# Patient Record
Sex: MALE | Race: BLACK OR AFRICAN AMERICAN | Employment: FULL TIME | URBAN - METROPOLITAN AREA
[De-identification: names, ages, dates, MRNs, and addresses within clinical notes are randomized per-mention and may not be internally consistent; named-entity substitution may affect disease eponyms.]

---

## 2017-05-27 ENCOUNTER — HOSPITAL ENCOUNTER (OUTPATIENT)
Facility: HOSPITAL | Age: 42
Setting detail: OBSERVATION
Discharge: HOME | End: 2017-05-29
Attending: EMERGENCY MEDICINE | Admitting: HOSPITALIST
Payer: COMMERCIAL

## 2017-05-27 ENCOUNTER — APPOINTMENT (OUTPATIENT)
Dept: GENERAL RADIOLOGY | Facility: HOSPITAL | Age: 42
End: 2017-05-27
Attending: EMERGENCY MEDICINE
Payer: COMMERCIAL

## 2017-05-27 DIAGNOSIS — R07.9 CHEST PAIN, UNSPECIFIED TYPE: Primary | ICD-10-CM

## 2017-05-27 PROCEDURE — 71010 XR CHEST AP PORTABLE  (CPT=71010): CPT | Performed by: EMERGENCY MEDICINE

## 2017-05-27 PROCEDURE — 99220 INITIAL OBSERVATION CARE,LEVL III: CPT | Performed by: HOSPITALIST

## 2017-05-27 RX ORDER — HEPARIN SODIUM 5000 [USP'U]/ML
5000 INJECTION, SOLUTION INTRAVENOUS; SUBCUTANEOUS EVERY 12 HOURS SCHEDULED
Status: DISCONTINUED | OUTPATIENT
Start: 2017-05-27 | End: 2017-05-29

## 2017-05-27 RX ORDER — POTASSIUM CHLORIDE 20 MEQ/1
40 TABLET, EXTENDED RELEASE ORAL EVERY 4 HOURS
Status: COMPLETED | OUTPATIENT
Start: 2017-05-27 | End: 2017-05-28

## 2017-05-27 RX ORDER — ASPIRIN 325 MG
325 TABLET ORAL DAILY
Status: DISCONTINUED | OUTPATIENT
Start: 2017-05-28 | End: 2017-05-29

## 2017-05-27 RX ORDER — NITROGLYCERIN 0.4 MG/1
0.4 TABLET SUBLINGUAL EVERY 5 MIN PRN
Status: DISCONTINUED | OUTPATIENT
Start: 2017-05-27 | End: 2017-05-29

## 2017-05-27 RX ORDER — ASPIRIN 81 MG/1
243 TABLET, CHEWABLE ORAL ONCE
Status: COMPLETED | OUTPATIENT
Start: 2017-05-27 | End: 2017-05-27

## 2017-05-27 RX ORDER — NITROGLYCERIN 0.4 MG/1
0.4 TABLET SUBLINGUAL ONCE
Status: COMPLETED | OUTPATIENT
Start: 2017-05-27 | End: 2017-05-27

## 2017-05-28 ENCOUNTER — APPOINTMENT (OUTPATIENT)
Dept: CV DIAGNOSTICS | Facility: HOSPITAL | Age: 42
End: 2017-05-28
Attending: HOSPITALIST
Payer: COMMERCIAL

## 2017-05-28 ENCOUNTER — APPOINTMENT (OUTPATIENT)
Dept: NUCLEAR MEDICINE | Facility: HOSPITAL | Age: 42
End: 2017-05-28
Attending: HOSPITALIST
Payer: COMMERCIAL

## 2017-05-28 PROCEDURE — 78452 HT MUSCLE IMAGE SPECT MULT: CPT | Performed by: HOSPITALIST

## 2017-05-28 PROCEDURE — 93017 CV STRESS TEST TRACING ONLY: CPT | Performed by: HOSPITALIST

## 2017-05-28 PROCEDURE — 99226 SUBSEQUENT OBSERVATION CARE: CPT | Performed by: HOSPITALIST

## 2017-05-28 RX ORDER — ENALAPRILAT 2.5 MG/2ML
1.25 INJECTION INTRAVENOUS EVERY 6 HOURS PRN
Status: DISCONTINUED | OUTPATIENT
Start: 2017-05-28 | End: 2017-05-29

## 2017-05-28 RX ORDER — METOPROLOL SUCCINATE 25 MG/1
25 TABLET, EXTENDED RELEASE ORAL
Qty: 30 TABLET | Refills: 0 | Status: SHIPPED | OUTPATIENT
Start: 2017-05-28 | End: 2017-05-29

## 2017-05-28 RX ORDER — METOPROLOL SUCCINATE 25 MG/1
25 TABLET, EXTENDED RELEASE ORAL
Status: DISCONTINUED | OUTPATIENT
Start: 2017-05-28 | End: 2017-05-29

## 2017-05-28 RX ORDER — 0.9 % SODIUM CHLORIDE 0.9 %
VIAL (ML) INJECTION
Status: COMPLETED
Start: 2017-05-28 | End: 2017-05-28

## 2017-05-28 RX ORDER — SODIUM CHLORIDE 9 MG/ML
INJECTION, SOLUTION INTRAVENOUS
Status: COMPLETED
Start: 2017-05-28 | End: 2017-05-28

## 2017-05-28 NOTE — PLAN OF CARE
CARDIOVASCULAR - ADULT    • Absence of cardiac arrhythmias or at baseline Progressing        Patient/Family Goals    • Patient/Family Short Term Goal Progressing        Stress test pending; monitor blood pressure; if stable plan for discharge home

## 2017-05-28 NOTE — CONSULTS
[facility] AND [facility]  Cardiology Consultation    Venuselysupa Tonja Patient Status:  Observation    1975 MRN [id number]   Location [facility]5W Attending Joe Hernández, [address] [facility] Day # 1 PCP No primary care provider on file.      Reason for Consu MG/ML injection 0.5 mg, 0.5 mg, Intravenous, PRN  •  nitroGLYCERIN (NITROSTAT) SL tab 0.4 mg, 0.4 mg, Sublingual, Q5 Min PRN  •  Heparin Sodium (Porcine) 5000 UNIT/ML injection 5,000 Units, 5,000 Units, Subcutaneous, 2 times per day    Review of Systems: you for allowing me to participate in the care of your patient.     Kimberli Garza MD  5/28/2017  6:45 PM

## 2017-05-28 NOTE — H&P
[facility]    PATIENT'S NAME: Recardo Bone   ATTENDING PHYSICIAN: Nellie Goodwin MD   PATIENT ACCOUNT#:   [id number]    LOCATION:  [address] [place] [address] RECORD #:   [id number]       YOB: 1975  ADMISSION DATE:       05/27 any swelling in his lower extremities or calf tenderness. He has had no wheezing or hemoptysis, no shortness of breath, no nausea or diaphoresis. He was admitted to the telemetry unit for continued chest pain protocol and ultimate stress testing.   When I 163/123. Temperature was 98.3, pulse 73, respiratory rate 18, O2 saturation was 98%. HEENT:  Normocephalic, atraumatic. Pupils equal, reactive to light. Sclerae anicteric. There was no sinus tenderness. Extraocular movements were intact.   Mucous memb medication. We will hold a.m. dose pending stress test so as to not interfere with its accuracy. 3.   Sleep apnea.   The patient reports that he has definitely had a diagnosis of sleep apnea, but the test was inconclusive apparently in terms of what the b

## 2017-05-28 NOTE — BH PROGRESS NOTE
ADMISSION NOTE    40year old male with h/o HTN untreated,  CHELA untreated,  ADHD on adderal presents with chest pain normal cardiac enzymes. . Available medical records partially reviewed. Dictation to follow.     Haseeb Membreno M.D.  5/27/2017

## 2017-05-28 NOTE — IMAGING NOTE
Pt here for a GXT thallium stress test, but B/P is elevated. 157/118 left arm and 157/111 after a 5 min recheck to left arm. Dr Maria Teresa Gunn notified and stated to change test to a lexiscan. Pt denies any c/o headache or chest pain at this time.

## 2017-05-28 NOTE — PLAN OF CARE
Maintains optimal cardiac output and hemodynamic stability Progressing      Absence of cardiac arrhythmias or at baseline Progressing      Patient/Family Long Term Goal Progressing      Patient/Family Short Term Goal Progressing    Pt to have stress test a

## 2017-05-29 ENCOUNTER — APPOINTMENT (OUTPATIENT)
Dept: CV DIAGNOSTICS | Facility: HOSPITAL | Age: 42
End: 2017-05-29
Attending: INTERNAL MEDICINE
Payer: COMMERCIAL

## 2017-05-29 VITALS
HEART RATE: 73 BPM | OXYGEN SATURATION: 98 % | DIASTOLIC BLOOD PRESSURE: 103 MMHG | BODY MASS INDEX: 30.43 KG/M2 | TEMPERATURE: 98 F | RESPIRATION RATE: 14 BRPM | WEIGHT: 247.31 LBS | HEIGHT: 75.6 IN | SYSTOLIC BLOOD PRESSURE: 157 MMHG

## 2017-05-29 PROCEDURE — 93306 TTE W/DOPPLER COMPLETE: CPT | Performed by: INTERNAL MEDICINE

## 2017-05-29 PROCEDURE — 99217 OBSERVATION CARE DISCHARGE: CPT | Performed by: HOSPITALIST

## 2017-05-29 RX ORDER — HYDROCHLOROTHIAZIDE 12.5 MG/1
12.5 CAPSULE, GELATIN COATED ORAL DAILY
Status: DISCONTINUED | OUTPATIENT
Start: 2017-05-29 | End: 2017-05-29

## 2017-05-29 RX ORDER — HYDROCHLOROTHIAZIDE 12.5 MG/1
12.5 CAPSULE, GELATIN COATED ORAL DAILY
Qty: 30 CAPSULE | Refills: 3 | Status: SHIPPED | OUTPATIENT
Start: 2017-05-29 | End: 2017-12-04

## 2017-05-29 RX ORDER — LISINOPRIL 2.5 MG/1
2.5 TABLET ORAL ONCE
Status: COMPLETED | OUTPATIENT
Start: 2017-05-29 | End: 2017-05-29

## 2017-05-29 RX ORDER — LISINOPRIL 2.5 MG/1
2.5 TABLET ORAL ONCE
Status: DISCONTINUED | OUTPATIENT
Start: 2017-05-29 | End: 2017-05-29

## 2017-05-29 RX ORDER — LISINOPRIL 2.5 MG/1
2.5 TABLET ORAL DAILY
Status: DISCONTINUED | OUTPATIENT
Start: 2017-05-29 | End: 2017-05-29

## 2017-05-29 RX ORDER — ACETAMINOPHEN 325 MG/1
650 TABLET ORAL EVERY 6 HOURS PRN
Status: DISCONTINUED | OUTPATIENT
Start: 2017-05-29 | End: 2017-05-29

## 2017-05-29 RX ORDER — LISINOPRIL 5 MG/1
5 TABLET ORAL DAILY
Qty: 30 TABLET | Refills: 3 | Status: SHIPPED | OUTPATIENT
Start: 2017-05-30 | End: 2017-12-04

## 2017-05-29 RX ORDER — LISINOPRIL 5 MG/1
5 TABLET ORAL DAILY
Status: DISCONTINUED | OUTPATIENT
Start: 2017-05-30 | End: 2017-05-29

## 2017-05-29 NOTE — DISCHARGE SUMMARY
[facility] - [facility]    Discharge Summary    Mark Rodríguez Patient Status:  Observation    1975 MRN [id number]   Location [facility]5W Attending Sammy Yoder, [address] [facility] Day # 2 PCP No primary care provider on file.      Date of A chest.  It was worse with deep breaths, but even without deep inspiration, the pain was present and constant.  He went to immediate care about 4 p.m., was evaluated there and sent to the emergency room for evaluation where he received 2 sublingual nitrogly details    hydrochlorothiazide 12.5 MG Caps   Commonly known as:  MICROZIDE        Take 1 capsule (12.5 mg total) by mouth daily.     Quantity:  30 capsule   Refills:  3       lisinopril 5 MG Tabs   Last time this was given:  2.5 mg on 5/29/2017  9:21 AM

## 2017-05-29 NOTE — PROGRESS NOTES
[facility]    Progress Note    Gagan Brown Patient Status:  Observation    1975 MRN [id number]   Location [facility]5W Attending Tena Winter, [address] Day # 2 PCP No primary care provider on file.        Assessment an Recent Labs   Lab  05/27/17   1806  05/28/17   1004   GLU  133*   --    BUN  13   --    CREATSERUM  0.99   --    GFRAA  >60   --    GFRNAA  >60   --    CA  9.1   --    NA  137   --    K  3.4  4.0   CL  104   --    CO2  24   --        Recent Labs   La

## 2017-06-30 ENCOUNTER — BH HISTORICAL (OUTPATIENT)
Dept: OTHER | Age: 42
End: 2017-06-30

## 2017-09-06 ENCOUNTER — BH HISTORICAL (OUTPATIENT)
Dept: OTHER | Age: 42
End: 2017-09-06

## 2017-11-17 ENCOUNTER — BH HISTORICAL (OUTPATIENT)
Dept: OTHER | Age: 42
End: 2017-11-17

## 2017-12-04 ENCOUNTER — OFFICE VISIT (OUTPATIENT)
Dept: INTERNAL MEDICINE CLINIC | Facility: CLINIC | Age: 42
End: 2017-12-04

## 2017-12-04 VITALS
HEIGHT: 75 IN | DIASTOLIC BLOOD PRESSURE: 114 MMHG | TEMPERATURE: 98 F | HEART RATE: 82 BPM | BODY MASS INDEX: 30.3 KG/M2 | WEIGHT: 243.69 LBS | SYSTOLIC BLOOD PRESSURE: 164 MMHG

## 2017-12-04 DIAGNOSIS — I10 ESSENTIAL HYPERTENSION: ICD-10-CM

## 2017-12-04 DIAGNOSIS — G47.33 OSA (OBSTRUCTIVE SLEEP APNEA): ICD-10-CM

## 2017-12-04 DIAGNOSIS — F98.8 ADD (ATTENTION DEFICIT DISORDER) WITHOUT HYPERACTIVITY: ICD-10-CM

## 2017-12-04 DIAGNOSIS — Z00.00 ANNUAL PHYSICAL EXAM: Primary | ICD-10-CM

## 2017-12-04 DIAGNOSIS — I10 UNCONTROLLED HYPERTENSION: ICD-10-CM

## 2017-12-04 DIAGNOSIS — K90.41 NCGS (NON-CELIAC GLUTEN SENSITIVITY): ICD-10-CM

## 2017-12-04 PROCEDURE — 99213 OFFICE O/P EST LOW 20 MIN: CPT | Performed by: INTERNAL MEDICINE

## 2017-12-04 PROCEDURE — 99212 OFFICE O/P EST SF 10 MIN: CPT | Performed by: INTERNAL MEDICINE

## 2017-12-04 PROCEDURE — 99386 PREV VISIT NEW AGE 40-64: CPT | Performed by: INTERNAL MEDICINE

## 2017-12-04 RX ORDER — HYDROCHLOROTHIAZIDE 12.5 MG/1
12.5 CAPSULE, GELATIN COATED ORAL DAILY
Qty: 30 CAPSULE | Refills: 6 | Status: SHIPPED | OUTPATIENT
Start: 2017-12-04 | End: 2018-03-05

## 2017-12-04 RX ORDER — DEXTROAMPHETAMINE SACCHARATE, AMPHETAMINE ASPARTATE MONOHYDRATE, DEXTROAMPHETAMINE SULFATE AND AMPHETAMINE SULFATE 6.25; 6.25; 6.25; 6.25 MG/1; MG/1; MG/1; MG/1
CAPSULE, EXTENDED RELEASE ORAL
COMMUNITY
Start: 2017-11-17

## 2017-12-04 RX ORDER — LISINOPRIL 5 MG/1
5 TABLET ORAL DAILY
Qty: 30 TABLET | Refills: 6 | Status: CANCELLED | OUTPATIENT
Start: 2017-12-04

## 2017-12-04 RX ORDER — LISINOPRIL 20 MG/1
20 TABLET ORAL DAILY
Qty: 30 TABLET | Refills: 3 | Status: SHIPPED | OUTPATIENT
Start: 2017-12-04 | End: 2018-03-04

## 2017-12-04 NOTE — PROGRESS NOTES
Catrina Landis is a 40year old male. Patient presents with:  Physical      HPI:   Here for annual physical    He reports worsening of abdominal bloating, worse with diary products. He reports lot of flatulence.  He also reports increased  bowel movements, Never Used                      Alcohol use: Yes              Comment: 1 per mth       REVIEW OF SYSTEMS:   GENERAL HEALTH: No fevers, chills, sweats, fatigue. VISION: No recent vision problems, blurry vision or double vision.   HEENT: No decreased hearing memory normal.         ASSESSMENT AND PLAN:   Diagnoses and all orders for this visit:    Annual physical exam  Labs ordered. Discussed with the patient about age appropriate screening and immunization.   Advised healthy lifestyle with regular exercise and avoid frozen, canned and processed food. Maintain healthy weight and exercise regularly at least 30-40 minutes 4-5 times a week. Advised to lose weight and make dietary modification. Other orders  -     hydrochlorothiazide 12.5 MG Oral Cap;  Take 1 cap

## 2018-01-02 ENCOUNTER — OFFICE VISIT (OUTPATIENT)
Dept: INTERNAL MEDICINE CLINIC | Facility: CLINIC | Age: 43
End: 2018-01-02

## 2018-01-02 VITALS
HEART RATE: 80 BPM | TEMPERATURE: 99 F | WEIGHT: 245.69 LBS | SYSTOLIC BLOOD PRESSURE: 150 MMHG | DIASTOLIC BLOOD PRESSURE: 85 MMHG | HEIGHT: 75 IN | BODY MASS INDEX: 30.55 KG/M2

## 2018-01-02 DIAGNOSIS — I10 BENIGN ESSENTIAL HYPERTENSION: Primary | ICD-10-CM

## 2018-01-02 PROCEDURE — 99213 OFFICE O/P EST LOW 20 MIN: CPT | Performed by: INTERNAL MEDICINE

## 2018-01-02 PROCEDURE — 99212 OFFICE O/P EST SF 10 MIN: CPT | Performed by: INTERNAL MEDICINE

## 2018-01-06 ENCOUNTER — CHARTING TRANS (OUTPATIENT)
Dept: OTHER | Age: 43
End: 2018-01-06

## 2018-01-06 ENCOUNTER — LAB ENCOUNTER (OUTPATIENT)
Dept: LAB | Facility: HOSPITAL | Age: 43
End: 2018-01-06
Attending: INTERNAL MEDICINE
Payer: COMMERCIAL

## 2018-01-06 DIAGNOSIS — Z00.00 ANNUAL PHYSICAL EXAM: ICD-10-CM

## 2018-01-06 DIAGNOSIS — K90.41 NCGS (NON-CELIAC GLUTEN SENSITIVITY): ICD-10-CM

## 2018-01-06 LAB
ALBUMIN SERPL BCP-MCNC: 4.2 G/DL (ref 3.5–4.8)
ALBUMIN/GLOB SERPL: 1.4 {RATIO} (ref 1–2)
ALP SERPL-CCNC: 66 U/L (ref 32–100)
ALT SERPL-CCNC: 22 U/L (ref 17–63)
ANION GAP SERPL CALC-SCNC: 8 MMOL/L (ref 0–18)
AST SERPL-CCNC: 21 U/L (ref 15–41)
BASOPHILS # BLD: 0.1 K/UL (ref 0–0.2)
BASOPHILS NFR BLD: 1 %
BILIRUB SERPL-MCNC: 1.1 MG/DL (ref 0.3–1.2)
BUN SERPL-MCNC: 11 MG/DL (ref 8–20)
BUN/CREAT SERPL: 12.4 (ref 10–20)
CALCIUM SERPL-MCNC: 9.3 MG/DL (ref 8.5–10.5)
CHLORIDE SERPL-SCNC: 100 MMOL/L (ref 95–110)
CHOLEST SERPL-MCNC: 190 MG/DL (ref 110–200)
CO2 SERPL-SCNC: 26 MMOL/L (ref 22–32)
CREAT SERPL-MCNC: 0.89 MG/DL (ref 0.5–1.5)
EOSINOPHIL # BLD: 0.2 K/UL (ref 0–0.7)
EOSINOPHIL NFR BLD: 3 %
ERYTHROCYTE [DISTWIDTH] IN BLOOD BY AUTOMATED COUNT: 13 % (ref 11–15)
GLOBULIN PLAS-MCNC: 3 G/DL (ref 2.5–3.7)
GLUCOSE SERPL-MCNC: 171 MG/DL (ref 70–99)
HBA1C MFR BLD: 6.2 % (ref 4–6)
HCT VFR BLD AUTO: 41.8 % (ref 41–52)
HDLC SERPL-MCNC: 36 MG/DL
HGB BLD-MCNC: 14.5 G/DL (ref 13.5–17.5)
LDLC SERPL CALC-MCNC: 96 MG/DL (ref 0–99)
LYMPHOCYTES # BLD: 2.5 K/UL (ref 1–4)
LYMPHOCYTES NFR BLD: 34 %
MCH RBC QN AUTO: 29.1 PG (ref 27–32)
MCHC RBC AUTO-ENTMCNC: 34.6 G/DL (ref 32–37)
MCV RBC AUTO: 84 FL (ref 80–100)
MONOCYTES # BLD: 0.8 K/UL (ref 0–1)
MONOCYTES NFR BLD: 10 %
NEUTROPHILS # BLD AUTO: 3.9 K/UL (ref 1.8–7.7)
NEUTROPHILS NFR BLD: 52 %
NONHDLC SERPL-MCNC: 154 MG/DL
OSMOLALITY UR CALC.SUM OF ELEC: 281 MOSM/KG (ref 275–295)
PLATELET # BLD AUTO: 284 K/UL (ref 140–400)
PMV BLD AUTO: 8.7 FL (ref 7.4–10.3)
POTASSIUM SERPL-SCNC: 3.4 MMOL/L (ref 3.3–5.1)
PROT SERPL-MCNC: 7.2 G/DL (ref 5.9–8.4)
PSA SERPL-MCNC: 1.1 NG/ML (ref 0–4)
RBC # BLD AUTO: 4.97 M/UL (ref 4.5–5.9)
SODIUM SERPL-SCNC: 134 MMOL/L (ref 136–144)
TRIGL SERPL-MCNC: 291 MG/DL (ref 1–149)
TSH SERPL-ACNC: 0.65 UIU/ML (ref 0.45–5.33)
WBC # BLD AUTO: 7.5 K/UL (ref 4–11)

## 2018-01-06 PROCEDURE — 36415 COLL VENOUS BLD VENIPUNCTURE: CPT

## 2018-01-06 PROCEDURE — 82306 VITAMIN D 25 HYDROXY: CPT

## 2018-01-06 PROCEDURE — 80061 LIPID PANEL: CPT

## 2018-01-06 PROCEDURE — 83036 HEMOGLOBIN GLYCOSYLATED A1C: CPT

## 2018-01-06 PROCEDURE — 84443 ASSAY THYROID STIM HORMONE: CPT

## 2018-01-06 PROCEDURE — 83516 IMMUNOASSAY NONANTIBODY: CPT

## 2018-01-06 PROCEDURE — 80053 COMPREHEN METABOLIC PANEL: CPT

## 2018-01-06 PROCEDURE — 85025 COMPLETE CBC W/AUTO DIFF WBC: CPT

## 2018-01-09 ENCOUNTER — TELEPHONE (OUTPATIENT)
Dept: OTHER | Age: 43
End: 2018-01-09

## 2018-01-09 PROBLEM — R73.03 PREDIABETES: Status: ACTIVE | Noted: 2018-01-09

## 2018-01-09 PROBLEM — E78.1 HYPERTRIGLYCERIDEMIA: Status: ACTIVE | Noted: 2018-01-09

## 2018-01-09 LAB — 25(OH)D3 SERPL-MCNC: 13.1 NG/ML

## 2018-01-10 PROBLEM — E55.9 VITAMIN D DEFICIENCY: Status: ACTIVE | Noted: 2018-01-10

## 2018-01-10 LAB — TTG IGG SER-ACNC: <0.6 U/ML (ref ?–7)

## 2018-01-27 NOTE — ED INITIAL ASSESSMENT (HPI)
Patient came from immediate care, c/o chest pain, constant since about 1030 today. Denies SOB. Pain worsens when taking a deep breath and exhaling. No cardiac hx that hes aware of. Alert, oriented and steady gait. Received 81mg aspirin PTA.  Chem 98
mid chest

## 2018-02-14 ENCOUNTER — APPOINTMENT (OUTPATIENT)
Dept: LAB | Facility: HOSPITAL | Age: 43
End: 2018-02-14
Attending: INTERNAL MEDICINE
Payer: COMMERCIAL

## 2018-02-14 ENCOUNTER — TELEPHONE (OUTPATIENT)
Dept: INTERNAL MEDICINE CLINIC | Facility: CLINIC | Age: 43
End: 2018-02-14

## 2018-02-14 DIAGNOSIS — E78.1 HYPERTRIGLYCERIDEMIA: Primary | ICD-10-CM

## 2018-02-14 DIAGNOSIS — E78.1 HYPERTRIGLYCERIDEMIA: ICD-10-CM

## 2018-02-14 LAB
CHOLEST SERPL-MCNC: 206 MG/DL (ref 110–200)
HDLC SERPL-MCNC: 36 MG/DL
LDLC SERPL CALC-MCNC: 137 MG/DL (ref 0–99)
NONHDLC SERPL-MCNC: 170 MG/DL
TRIGL SERPL-MCNC: 165 MG/DL (ref 1–149)

## 2018-02-14 PROCEDURE — 80061 LIPID PANEL: CPT

## 2018-02-14 PROCEDURE — 36415 COLL VENOUS BLD VENIPUNCTURE: CPT

## 2018-02-14 NOTE — TELEPHONE ENCOUNTER
Please inform the patient that I have placed the order for lipid panel, as it is abnormal before with elevated triglycerides and he was not fasting.

## 2018-02-14 NOTE — TELEPHONE ENCOUNTER
Pt states that he is at the [facility] to do a repeat of his blood work. Per pt he was told to have them repeated that the last he received the results. Brennan Alvarado from [facility] out patient registration could be reached at ext [phone].

## 2018-02-14 NOTE — TELEPHONE ENCOUNTER
Patient is at the lab and was told in his last visit that there would be in order in the system to get labs done. Did you want to order any?

## 2018-03-05 ENCOUNTER — OFFICE VISIT (OUTPATIENT)
Dept: INTERNAL MEDICINE CLINIC | Facility: CLINIC | Age: 43
End: 2018-03-05

## 2018-03-05 VITALS
HEART RATE: 76 BPM | BODY MASS INDEX: 30.96 KG/M2 | DIASTOLIC BLOOD PRESSURE: 97 MMHG | TEMPERATURE: 99 F | SYSTOLIC BLOOD PRESSURE: 152 MMHG | WEIGHT: 249 LBS | HEIGHT: 75 IN

## 2018-03-05 DIAGNOSIS — I10 ESSENTIAL HYPERTENSION: Primary | ICD-10-CM

## 2018-03-05 DIAGNOSIS — E78.2 MIXED HYPERLIPIDEMIA: ICD-10-CM

## 2018-03-05 DIAGNOSIS — R73.03 PREDIABETES: ICD-10-CM

## 2018-03-05 PROCEDURE — 99214 OFFICE O/P EST MOD 30 MIN: CPT | Performed by: INTERNAL MEDICINE

## 2018-03-05 PROCEDURE — 99212 OFFICE O/P EST SF 10 MIN: CPT | Performed by: INTERNAL MEDICINE

## 2018-03-05 RX ORDER — ERGOCALCIFEROL 1.25 MG/1
50000 CAPSULE ORAL WEEKLY
Qty: 12 CAPSULE | Refills: 0 | Status: SHIPPED | OUTPATIENT
Start: 2018-03-05 | End: 2018-06-09

## 2018-03-05 RX ORDER — HYDROCHLOROTHIAZIDE 25 MG/1
25 TABLET ORAL DAILY
Qty: 90 TABLET | Refills: 1 | Status: SHIPPED | OUTPATIENT
Start: 2018-03-05 | End: 2018-06-03

## 2018-03-05 RX ORDER — LISINOPRIL 10 MG/1
10 TABLET ORAL DAILY
Qty: 30 TABLET | Refills: 3 | Status: SHIPPED | OUTPATIENT
Start: 2018-03-05 | End: 2019-09-27

## 2018-03-05 NOTE — PROGRESS NOTES
Virgia Closs is a 40year old male. Patient presents with:  Hypertension: f/u  Abnormal Labs      HPI:   Patient is here for follow up of hypertension and abnormal blood work results. He is on hydrochlorothiazide 12.5 mg for hypertension.   He has been swelling in feet. GI: denies abdominal pain and denies heartburn, nausea or vomiting. : Increased urinary frequency, being on water pill. Breanne Spring MUSK: Back pain present.   NEURO: denies tingling numbness or headaches  ENDO: No fatigue, polyuria, polydipsia, lisinopril 10 mg daily. Reassess in 4 weeks. If needed can go up on lisinopril at that time. Side effects of lisinopril especially dry cough, hyperkalemia and angioedema explained to the patient. He verbalizes understanding.     Mixed hyperlipidemia  La

## 2018-03-08 ENCOUNTER — TELEPHONE (OUTPATIENT)
Dept: OTHER | Age: 43
End: 2018-03-08

## 2018-03-08 NOTE — TELEPHONE ENCOUNTER
Pt had question regarding Vit d  .   Pharmacy called rx was picked on 3/2/18  The 3/5/18 authorized Rx will not be dispensed yet as refill request too soon  Pt informed

## 2018-03-08 NOTE — TELEPHONE ENCOUNTER
Patient stated that he did not get the ergocalciferol at the pharmacy. Script sent 3/5/18 with two other medications that the patient did receive.     Pharmacy called, script of 3/5/18 too early, received 4 pills on 3/2/18 for the month of March, he can pic

## 2018-03-18 ENCOUNTER — HOSPITAL ENCOUNTER (OUTPATIENT)
Age: 43
Discharge: HOME | End: 2018-03-18
Attending: EMERGENCY MEDICINE
Payer: COMMERCIAL

## 2018-03-18 VITALS
OXYGEN SATURATION: 98 % | RESPIRATION RATE: 16 BRPM | BODY MASS INDEX: 28.6 KG/M2 | HEART RATE: 72 BPM | SYSTOLIC BLOOD PRESSURE: 151 MMHG | WEIGHT: 230 LBS | HEIGHT: 75 IN | DIASTOLIC BLOOD PRESSURE: 103 MMHG | TEMPERATURE: 97 F

## 2018-03-18 DIAGNOSIS — S61.412A LACERATION OF LEFT HAND WITHOUT FOREIGN BODY, INITIAL ENCOUNTER: Primary | ICD-10-CM

## 2018-03-18 PROCEDURE — 12002 RPR S/N/AX/GEN/TRNK2.6-7.5CM: CPT

## 2018-03-18 PROCEDURE — 99213 OFFICE O/P EST LOW 20 MIN: CPT

## 2018-03-18 PROCEDURE — 90471 IMMUNIZATION ADMIN: CPT
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
## 2018-03-18 NOTE — ED PROVIDER NOTES
[facility] [address] [facility]    History   Patient presents with:  Laceration Abrasion (integumentary)    Stated Complaint: L hand laceration     HPI    The patient is a 52-year-old male with a history of hypertension who p noted    ED Course   Labs Reviewed - No data to display    ED Course as of Mar 18 1825  ------------------------------------------------------------       [facility]   Procedure: Irrigated copiously with saline and prepped with Betadine  Anesthetized with 1% with

## 2018-03-19 ENCOUNTER — HOSPITAL ENCOUNTER (OUTPATIENT)
Age: 43
Discharge: HOME | End: 2018-03-19
Payer: COMMERCIAL

## 2018-03-19 VITALS
SYSTOLIC BLOOD PRESSURE: 150 MMHG | BODY MASS INDEX: 28.6 KG/M2 | TEMPERATURE: 98 F | OXYGEN SATURATION: 99 % | WEIGHT: 230 LBS | RESPIRATION RATE: 18 BRPM | DIASTOLIC BLOOD PRESSURE: 99 MMHG | HEART RATE: 74 BPM | HEIGHT: 75 IN

## 2018-03-19 DIAGNOSIS — Z51.89 VISIT FOR WOUND CHECK: Primary | ICD-10-CM

## 2018-03-19 PROCEDURE — 99211 OFF/OP EST MAY X REQ PHY/QHP: CPT

## 2018-03-19 RX ORDER — GINSENG 100 MG
CAPSULE ORAL ONCE
Status: COMPLETED | OUTPATIENT
Start: 2018-03-19 | End: 2018-03-19

## 2018-03-19 NOTE — ED INITIAL ASSESSMENT (HPI)
Pt with suture to palm of left hand. States he went to work today and felt pulling sensation at suture line with increased pain. Concerned he may have \"popped\" a suture.  Suture line intact

## 2018-03-19 NOTE — ED PROVIDER NOTES
Patient presents with:  Laceration Abrasion (integumentary)      HPI:     Adrienne Sellers is a 40year old male presents for a chief complaint of needing a wound check. The patient had 8 sutures placed in the palm of the left hand yesterday.   The patient s intact. The patient is able to move all digits of the left hand without difficulty. He is able to make a fist without any difficulty. CMS is intact. Anesthetic / Repair:  No further repair is needed.   Wound care and a dressing was applied with bacit week  For suture removal

## 2018-03-26 ENCOUNTER — HOSPITAL ENCOUNTER (OUTPATIENT)
Age: 43
Discharge: HOME | End: 2018-03-26
Payer: COMMERCIAL

## 2018-03-26 VITALS
DIASTOLIC BLOOD PRESSURE: 91 MMHG | RESPIRATION RATE: 18 BRPM | SYSTOLIC BLOOD PRESSURE: 129 MMHG | HEART RATE: 79 BPM | OXYGEN SATURATION: 100 % | TEMPERATURE: 98 F

## 2018-03-26 DIAGNOSIS — Z48.02 ENCOUNTER FOR REMOVAL OF SUTURES: Primary | ICD-10-CM

## 2018-03-26 NOTE — ED PROVIDER NOTES
Patient presents with:  John Morgan (ingtegumentary)      HPI:     Virgia Closs is a 40year old male presents for suture removal removal. Injury occurred 8 days ago. The patient denies wound problems or concerns.      Family History   Problem R removed out complication. The patient tolerated the procedure well. There are no signs of infection and the wound is healing appropriately. Diagnosis:    ICD-10-CM    1.  Encounter for removal of sutures Z48.02

## 2018-04-09 ENCOUNTER — OFFICE VISIT (OUTPATIENT)
Dept: INTERNAL MEDICINE CLINIC | Facility: CLINIC | Age: 43
End: 2018-04-09

## 2018-04-09 VITALS
TEMPERATURE: 99 F | BODY MASS INDEX: 29.12 KG/M2 | HEIGHT: 75 IN | DIASTOLIC BLOOD PRESSURE: 79 MMHG | WEIGHT: 234.19 LBS | HEART RATE: 81 BPM | SYSTOLIC BLOOD PRESSURE: 123 MMHG

## 2018-04-09 DIAGNOSIS — R73.03 PREDIABETES: ICD-10-CM

## 2018-04-09 DIAGNOSIS — I10 ESSENTIAL HYPERTENSION: Primary | ICD-10-CM

## 2018-04-09 PROBLEM — R07.9 CHEST PAIN: Status: RESOLVED | Noted: 2017-05-27 | Resolved: 2018-04-09

## 2018-04-09 PROCEDURE — 99213 OFFICE O/P EST LOW 20 MIN: CPT | Performed by: INTERNAL MEDICINE

## 2018-04-09 PROCEDURE — 99212 OFFICE O/P EST SF 10 MIN: CPT | Performed by: INTERNAL MEDICINE

## 2018-04-09 NOTE — ASSESSMENT & PLAN NOTE
Hemoglobin A1c of 6.2 in February 2018 with hyperglycemia. Advised low-carb ADA diet. Explained the risk of progression to full-blown diabetes if left uncontrolled. Microvascular and macrovascular complications of diabetes explained to the patient.   Alexandra

## 2018-04-09 NOTE — PROGRESS NOTES
Rafia Todd is a 40year old male. Patient presents with:  Hypertension: f/u      HPI:     Hypertension   This is a chronic problem. Episode onset: 1 year. The problem has been gradually improving since onset. The problem is controlled.  Associated symp Negative for shortness of breath. Cardiovascular: Negative for chest pain, palpitations, orthopnea and PND. Gastrointestinal: Negative. Musculoskeletal: Negative for neck pain. Neurological: Negative for headaches.    Psychiatric/Behavioral: 4-5 days/week                           The patient indicates understanding of these issues and agrees to the plan.               Franc Pang MD  4/9/2018  6:11 PM

## 2018-04-09 NOTE — ASSESSMENT & PLAN NOTE
Controlled on dual therapy with hydrochlorothiazide 25 mg and lisinopril 10 mg. Advised low salt diet. Eat less of canned , frozen, dried, packaged and fast food. Limit caffeine, alcohol. No smoking.   Exercise regularly, at least 20 minutes 3 times a we

## 2018-06-10 RX ORDER — ERGOCALCIFEROL 1.25 MG/1
CAPSULE ORAL
Qty: 12 CAPSULE | Refills: 0 | Status: SHIPPED | OUTPATIENT
Start: 2018-06-10

## 2018-06-10 NOTE — TELEPHONE ENCOUNTER
LOV: 4-9-18 Last Rx: 3-5-18     No protocol     Please advise in regards to refill request. Thank You

## 2018-08-21 ENCOUNTER — BH HISTORICAL (OUTPATIENT)
Dept: OTHER | Age: 43
End: 2018-08-21

## 2018-12-27 ENCOUNTER — TELEPHONE (OUTPATIENT)
Dept: BEHAVIORAL HEALTH | Age: 43
End: 2018-12-27

## 2018-12-28 RX ORDER — DEXTROAMPHETAMINE SACCHARATE, AMPHETAMINE ASPARTATE MONOHYDRATE, DEXTROAMPHETAMINE SULFATE AND AMPHETAMINE SULFATE 6.25; 6.25; 6.25; 6.25 MG/1; MG/1; MG/1; MG/1
25 CAPSULE, EXTENDED RELEASE ORAL DAILY
Qty: 30 CAPSULE | Refills: 0 | Status: SHIPPED | OUTPATIENT
Start: 2018-12-28 | End: 2019-02-09 | Stop reason: SDUPTHER

## 2018-12-28 RX ORDER — DEXTROAMPHETAMINE SACCHARATE, AMPHETAMINE ASPARTATE MONOHYDRATE, DEXTROAMPHETAMINE SULFATE AND AMPHETAMINE SULFATE 6.25; 6.25; 6.25; 6.25 MG/1; MG/1; MG/1; MG/1
CAPSULE, EXTENDED RELEASE ORAL DAILY
COMMUNITY
Start: 2018-08-07 | End: 2018-12-28 | Stop reason: SDUPTHER

## 2019-02-06 ENCOUNTER — TELEPHONE (OUTPATIENT)
Dept: BEHAVIORAL HEALTH | Age: 44
End: 2019-02-06

## 2019-02-09 RX ORDER — DEXTROAMPHETAMINE SACCHARATE, AMPHETAMINE ASPARTATE MONOHYDRATE, DEXTROAMPHETAMINE SULFATE AND AMPHETAMINE SULFATE 6.25; 6.25; 6.25; 6.25 MG/1; MG/1; MG/1; MG/1
25 CAPSULE, EXTENDED RELEASE ORAL DAILY
Qty: 30 CAPSULE | Refills: 0 | Status: SHIPPED | OUTPATIENT
Start: 2019-02-09 | End: 2019-02-11 | Stop reason: SDUPTHER

## 2019-02-14 ENCOUNTER — OFFICE VISIT (OUTPATIENT)
Dept: BEHAVIORAL HEALTH | Age: 44
End: 2019-02-14

## 2019-02-14 DIAGNOSIS — F90.0 ATTENTION DEFICIT HYPERACTIVITY DISORDER (ADHD), PREDOMINANTLY INATTENTIVE TYPE: Primary | ICD-10-CM

## 2019-02-14 PROCEDURE — 99213 OFFICE O/P EST LOW 20 MIN: CPT | Performed by: PSYCHIATRY & NEUROLOGY

## 2019-02-14 RX ORDER — DEXTROAMPHETAMINE SACCHARATE, AMPHETAMINE ASPARTATE MONOHYDRATE, DEXTROAMPHETAMINE SULFATE AND AMPHETAMINE SULFATE 6.25; 6.25; 6.25; 6.25 MG/1; MG/1; MG/1; MG/1
25 CAPSULE, EXTENDED RELEASE ORAL DAILY
Qty: 30 CAPSULE | Refills: 0 | Status: SHIPPED | OUTPATIENT
Start: 2019-02-14 | End: 2019-02-14 | Stop reason: SDUPTHER

## 2019-02-14 RX ORDER — DEXTROAMPHETAMINE SACCHARATE, AMPHETAMINE ASPARTATE MONOHYDRATE, DEXTROAMPHETAMINE SULFATE AND AMPHETAMINE SULFATE 6.25; 6.25; 6.25; 6.25 MG/1; MG/1; MG/1; MG/1
25 CAPSULE, EXTENDED RELEASE ORAL DAILY
Qty: 30 CAPSULE | Refills: 0 | Status: SHIPPED | OUTPATIENT
Start: 2019-02-14 | End: 2019-06-06 | Stop reason: SDUPTHER

## 2019-06-04 ENCOUNTER — TELEPHONE (OUTPATIENT)
Dept: BEHAVIORAL HEALTH | Age: 44
End: 2019-06-04

## 2019-06-06 RX ORDER — DEXTROAMPHETAMINE SACCHARATE, AMPHETAMINE ASPARTATE MONOHYDRATE, DEXTROAMPHETAMINE SULFATE AND AMPHETAMINE SULFATE 6.25; 6.25; 6.25; 6.25 MG/1; MG/1; MG/1; MG/1
25 CAPSULE, EXTENDED RELEASE ORAL DAILY
Qty: 30 CAPSULE | Refills: 0 | Status: SHIPPED | OUTPATIENT
Start: 2019-06-06 | End: 2019-07-12 | Stop reason: SDUPTHER

## 2019-07-12 ENCOUNTER — TELEPHONE (OUTPATIENT)
Dept: BEHAVIORAL HEALTH | Age: 44
End: 2019-07-12

## 2019-07-12 RX ORDER — DEXTROAMPHETAMINE SACCHARATE, AMPHETAMINE ASPARTATE MONOHYDRATE, DEXTROAMPHETAMINE SULFATE AND AMPHETAMINE SULFATE 6.25; 6.25; 6.25; 6.25 MG/1; MG/1; MG/1; MG/1
25 CAPSULE, EXTENDED RELEASE ORAL DAILY
Qty: 30 CAPSULE | Refills: 0 | Status: SHIPPED | OUTPATIENT
Start: 2019-07-12 | End: 2019-08-22 | Stop reason: SDUPTHER

## 2019-08-20 ENCOUNTER — TELEPHONE (OUTPATIENT)
Dept: BEHAVIORAL HEALTH | Age: 44
End: 2019-08-20

## 2019-08-22 RX ORDER — DEXTROAMPHETAMINE SACCHARATE, AMPHETAMINE ASPARTATE MONOHYDRATE, DEXTROAMPHETAMINE SULFATE AND AMPHETAMINE SULFATE 6.25; 6.25; 6.25; 6.25 MG/1; MG/1; MG/1; MG/1
25 CAPSULE, EXTENDED RELEASE ORAL DAILY
Qty: 30 CAPSULE | Refills: 0 | Status: SHIPPED | OUTPATIENT
Start: 2019-08-22 | End: 2019-09-20 | Stop reason: SDUPTHER

## 2019-09-20 ENCOUNTER — OFFICE VISIT (OUTPATIENT)
Dept: BEHAVIORAL HEALTH | Age: 44
End: 2019-09-20

## 2019-09-20 DIAGNOSIS — F90.0 ATTENTION DEFICIT HYPERACTIVITY DISORDER (ADHD), PREDOMINANTLY INATTENTIVE TYPE: Primary | ICD-10-CM

## 2019-09-20 PROCEDURE — 99213 OFFICE O/P EST LOW 20 MIN: CPT | Performed by: PSYCHIATRY & NEUROLOGY

## 2019-09-20 RX ORDER — DEXTROAMPHETAMINE SACCHARATE, AMPHETAMINE ASPARTATE MONOHYDRATE, DEXTROAMPHETAMINE SULFATE AND AMPHETAMINE SULFATE 6.25; 6.25; 6.25; 6.25 MG/1; MG/1; MG/1; MG/1
25 CAPSULE, EXTENDED RELEASE ORAL DAILY
Qty: 30 CAPSULE | Refills: 0 | Status: SHIPPED | OUTPATIENT
Start: 2019-09-20 | End: 2019-11-06 | Stop reason: SDUPTHER

## 2019-09-27 ENCOUNTER — HOSPITAL ENCOUNTER (OUTPATIENT)
Age: 44
Discharge: HOME | End: 2019-09-27
Payer: COMMERCIAL

## 2019-09-27 VITALS
SYSTOLIC BLOOD PRESSURE: 152 MMHG | OXYGEN SATURATION: 97 % | TEMPERATURE: 98 F | HEART RATE: 76 BPM | RESPIRATION RATE: 20 BRPM | DIASTOLIC BLOOD PRESSURE: 99 MMHG

## 2019-09-27 DIAGNOSIS — Z76.0 ENCOUNTER FOR MEDICATION REFILL: Primary | ICD-10-CM

## 2019-09-27 PROCEDURE — 99213 OFFICE O/P EST LOW 20 MIN: CPT

## 2019-09-27 PROCEDURE — 99214 OFFICE O/P EST MOD 30 MIN: CPT

## 2019-09-27 RX ORDER — LISINOPRIL 10 MG/1
10 TABLET ORAL DAILY
Qty: 30 TABLET | Refills: 3 | Status: SHIPPED | OUTPATIENT
Start: 2019-09-27

## 2019-09-27 NOTE — ED INITIAL ASSESSMENT (HPI)
PATIENT STATES HE FEELS LIKE HIS \"BLOOD PRESSURE IS UP. \"  STATES HE HAS NOT SEEN HIS PCP AND HAS RUN OUT OF HIS BLOOD PRESSURE MEDICATIONS. PROVIDER AT BEDSIDE.

## 2019-09-27 NOTE — ED PROVIDER NOTES
Patient presents with:  Hypertension (cardiovascular)      HPI:     Brittnee Taylor is a 38year old male with a past history of hypertension who was supposed to be on lisinopril presents with a chief complaint of medication refill.   Patient reports he is i for him. Pt verbalized plan of care and states understanding. Orders Placed This Encounter      lisinopril 10 MG Oral Tab          Sig: Take 1 tablet (10 mg total) by mouth daily.  For high blood pressure          Dispense:  30 tablet          Refill:

## 2019-11-04 ENCOUNTER — TELEPHONE (OUTPATIENT)
Dept: BEHAVIORAL HEALTH | Age: 44
End: 2019-11-04

## 2019-11-06 RX ORDER — DEXTROAMPHETAMINE SACCHARATE, AMPHETAMINE ASPARTATE MONOHYDRATE, DEXTROAMPHETAMINE SULFATE AND AMPHETAMINE SULFATE 6.25; 6.25; 6.25; 6.25 MG/1; MG/1; MG/1; MG/1
25 CAPSULE, EXTENDED RELEASE ORAL DAILY
Qty: 30 CAPSULE | Refills: 0 | Status: SHIPPED | OUTPATIENT
Start: 2019-11-06 | End: 2019-12-18 | Stop reason: SDUPTHER

## 2019-12-16 ENCOUNTER — TELEPHONE (OUTPATIENT)
Dept: BEHAVIORAL HEALTH | Age: 44
End: 2019-12-16

## 2019-12-18 RX ORDER — DEXTROAMPHETAMINE SACCHARATE, AMPHETAMINE ASPARTATE MONOHYDRATE, DEXTROAMPHETAMINE SULFATE AND AMPHETAMINE SULFATE 6.25; 6.25; 6.25; 6.25 MG/1; MG/1; MG/1; MG/1
25 CAPSULE, EXTENDED RELEASE ORAL DAILY
Qty: 30 CAPSULE | Refills: 0 | Status: SHIPPED | OUTPATIENT
Start: 2019-12-18 | End: 2020-02-05 | Stop reason: SDUPTHER

## 2020-02-03 ENCOUNTER — TELEPHONE (OUTPATIENT)
Dept: BEHAVIORAL HEALTH | Age: 45
End: 2020-02-03

## 2020-02-05 RX ORDER — DEXTROAMPHETAMINE SACCHARATE, AMPHETAMINE ASPARTATE MONOHYDRATE, DEXTROAMPHETAMINE SULFATE AND AMPHETAMINE SULFATE 6.25; 6.25; 6.25; 6.25 MG/1; MG/1; MG/1; MG/1
25 CAPSULE, EXTENDED RELEASE ORAL DAILY
Qty: 30 CAPSULE | Refills: 0 | Status: SHIPPED | OUTPATIENT
Start: 2020-02-05 | End: 2020-03-13 | Stop reason: SDUPTHER

## 2020-03-12 ENCOUNTER — TELEPHONE (OUTPATIENT)
Dept: BEHAVIORAL HEALTH | Age: 45
End: 2020-03-12

## 2020-03-13 RX ORDER — DEXTROAMPHETAMINE SACCHARATE, AMPHETAMINE ASPARTATE MONOHYDRATE, DEXTROAMPHETAMINE SULFATE AND AMPHETAMINE SULFATE 6.25; 6.25; 6.25; 6.25 MG/1; MG/1; MG/1; MG/1
25 CAPSULE, EXTENDED RELEASE ORAL DAILY
Qty: 30 CAPSULE | Refills: 0 | Status: SHIPPED | OUTPATIENT
Start: 2020-03-13 | End: 2020-03-14 | Stop reason: SDUPTHER

## 2020-03-14 RX ORDER — DEXTROAMPHETAMINE SACCHARATE, AMPHETAMINE ASPARTATE MONOHYDRATE, DEXTROAMPHETAMINE SULFATE AND AMPHETAMINE SULFATE 6.25; 6.25; 6.25; 6.25 MG/1; MG/1; MG/1; MG/1
25 CAPSULE, EXTENDED RELEASE ORAL DAILY
Qty: 30 CAPSULE | Refills: 0 | Status: SHIPPED | OUTPATIENT
Start: 2020-03-14 | End: 2020-05-10 | Stop reason: SDUPTHER

## 2020-03-17 ENCOUNTER — HOSPITAL ENCOUNTER (EMERGENCY)
Facility: HOSPITAL | Age: 45
Discharge: HOME | End: 2020-03-17
Attending: EMERGENCY MEDICINE
Payer: COMMERCIAL

## 2020-03-17 VITALS
DIASTOLIC BLOOD PRESSURE: 111 MMHG | WEIGHT: 220 LBS | HEART RATE: 84 BPM | SYSTOLIC BLOOD PRESSURE: 158 MMHG | OXYGEN SATURATION: 98 % | RESPIRATION RATE: 20 BRPM | BODY MASS INDEX: 27.35 KG/M2 | HEIGHT: 75 IN | TEMPERATURE: 98 F

## 2020-03-17 DIAGNOSIS — I10 POORLY-CONTROLLED HYPERTENSION: Primary | ICD-10-CM

## 2020-03-17 LAB
ANION GAP SERPL CALC-SCNC: 6 MMOL/L (ref 0–18)
BASOPHILS # BLD AUTO: 0.07 X10(3) UL (ref 0–0.2)
BASOPHILS NFR BLD AUTO: 0.7 %
BILIRUB UR QL: NEGATIVE
BUN BLD-MCNC: 9 MG/DL (ref 7–18)
BUN/CREAT SERPL: 8.7 (ref 10–20)
CALCIUM BLD-MCNC: 9 MG/DL (ref 8.5–10.1)
CHLORIDE SERPL-SCNC: 101 MMOL/L (ref 98–112)
CLARITY UR: CLEAR
CO2 SERPL-SCNC: 27 MMOL/L (ref 21–32)
COLOR UR: YELLOW
CREAT BLD-MCNC: 1.03 MG/DL (ref 0.7–1.3)
DEPRECATED RDW RBC AUTO: 34.1 FL (ref 35.1–46.3)
EOSINOPHIL # BLD AUTO: 0.09 X10(3) UL (ref 0–0.7)
EOSINOPHIL NFR BLD AUTO: 1 %
ERYTHROCYTE [DISTWIDTH] IN BLOOD BY AUTOMATED COUNT: 11.7 % (ref 11–15)
GLUCOSE BLD-MCNC: 123 MG/DL (ref 70–99)
GLUCOSE UR-MCNC: NEGATIVE MG/DL
HCT VFR BLD AUTO: 41.7 % (ref 39–53)
HGB BLD-MCNC: 15.2 G/DL (ref 13–17.5)
HGB UR QL STRIP.AUTO: NEGATIVE
IMM GRANULOCYTES # BLD AUTO: 0.05 X10(3) UL (ref 0–1)
IMM GRANULOCYTES NFR BLD: 0.5 %
KETONES UR-MCNC: NEGATIVE MG/DL
LEUKOCYTE ESTERASE UR QL STRIP.AUTO: NEGATIVE
LYMPHOCYTES # BLD AUTO: 2.95 X10(3) UL (ref 1–4)
LYMPHOCYTES NFR BLD AUTO: 31.3 %
MCH RBC QN AUTO: 29.4 PG (ref 26–34)
MCHC RBC AUTO-ENTMCNC: 36.5 G/DL (ref 31–37)
MCV RBC AUTO: 80.7 FL (ref 80–100)
MONOCYTES # BLD AUTO: 0.69 X10(3) UL (ref 0.1–1)
MONOCYTES NFR BLD AUTO: 7.3 %
NEUTROPHILS # BLD AUTO: 5.58 X10 (3) UL (ref 1.5–7.7)
NEUTROPHILS # BLD AUTO: 5.58 X10(3) UL (ref 1.5–7.7)
NEUTROPHILS NFR BLD AUTO: 59.2 %
NITRITE UR QL STRIP.AUTO: NEGATIVE
OSMOLALITY SERPL CALC.SUM OF ELEC: 278 MOSM/KG (ref 275–295)
PH UR: 6 [PH] (ref 5–8)
PLATELET # BLD AUTO: 337 10(3)UL (ref 150–450)
POTASSIUM SERPL-SCNC: 3.4 MMOL/L (ref 3.5–5.1)
PROT UR-MCNC: NEGATIVE MG/DL
RBC # BLD AUTO: 5.17 X10(6)UL (ref 4.3–5.7)
SODIUM SERPL-SCNC: 134 MMOL/L (ref 136–145)
SP GR UR STRIP: 1.01 (ref 1–1.03)
UROBILINOGEN UR STRIP-ACNC: <2
WBC # BLD AUTO: 9.4 X10(3) UL (ref 4–11)

## 2020-03-17 PROCEDURE — 96374 THER/PROPH/DIAG INJ IV PUSH: CPT

## 2020-03-17 PROCEDURE — 85025 COMPLETE CBC W/AUTO DIFF WBC: CPT | Performed by: EMERGENCY MEDICINE

## 2020-03-17 PROCEDURE — 81003 URINALYSIS AUTO W/O SCOPE: CPT | Performed by: EMERGENCY MEDICINE

## 2020-03-17 PROCEDURE — 80048 BASIC METABOLIC PNL TOTAL CA: CPT | Performed by: EMERGENCY MEDICINE

## 2020-03-17 PROCEDURE — 99284 EMERGENCY DEPT VISIT MOD MDM: CPT

## 2020-03-17 RX ORDER — POTASSIUM CHLORIDE 20 MEQ/1
40 TABLET, EXTENDED RELEASE ORAL ONCE
Status: COMPLETED | OUTPATIENT
Start: 2020-03-17 | End: 2020-03-17

## 2020-03-17 RX ORDER — HYDRALAZINE HYDROCHLORIDE 20 MG/ML
5 INJECTION INTRAMUSCULAR; INTRAVENOUS ONCE
Status: COMPLETED | OUTPATIENT
Start: 2020-03-17 | End: 2020-03-17

## 2020-03-17 RX ORDER — AMLODIPINE BESYLATE 5 MG/1
5 TABLET ORAL ONCE
Status: DISCONTINUED | OUTPATIENT
Start: 2020-03-17 | End: 2020-03-17

## 2020-03-17 NOTE — ED NOTES
PT on monitor. BP remains elevated. PT denies HA, dizziness, CP at present. Call light within reach. Will continue to monitor.

## 2020-03-17 NOTE — ED NOTES
Patient is resting comfortably on cart watching TV. No issues to note. Warm blanket provided for comfort. PT continues to deny SOB/CP/HA at present. BP remains elevated. Will continue to monitor.

## 2020-03-17 NOTE — ED PROVIDER NOTES
Patient Seen in: [facility] Emergency Department      History   Patient presents with:  Hypertension    Stated Complaint: HTN    HPI    Patient presents to the emergency department with elevated blood pressure.   He states that he was seen twice in Musculoskeletal: Normal range of motion and neck supple. Cardiovascular:      Rate and Rhythm: Normal rate and regular rhythm. Heart sounds: No murmur. Pulmonary:      Effort: Pulmonary effort is normal. No respiratory distress.       Breath s normal urine specimen. We will add Norvasc to his prescription and have him double up on his lisinopril to take 20 mg daily of lisinopril and 5 mg of amlodipine.           Disposition and Plan     Clinical Impression:  Poorly-controlled hypertension  (prim

## 2020-04-01 ENCOUNTER — TELEPHONE (OUTPATIENT)
Dept: BEHAVIORAL HEALTH | Age: 45
End: 2020-04-01

## 2020-04-02 RX ORDER — DEXTROAMPHETAMINE SACCHARATE, AMPHETAMINE ASPARTATE MONOHYDRATE, DEXTROAMPHETAMINE SULFATE AND AMPHETAMINE SULFATE 6.25; 6.25; 6.25; 6.25 MG/1; MG/1; MG/1; MG/1
25 CAPSULE, EXTENDED RELEASE ORAL DAILY
Qty: 30 CAPSULE | Refills: 0 | OUTPATIENT
Start: 2020-04-02 | End: 2021-01-12

## 2020-05-07 ENCOUNTER — TELEPHONE (OUTPATIENT)
Dept: BEHAVIORAL HEALTH | Age: 45
End: 2020-05-07

## 2020-05-10 RX ORDER — DEXTROAMPHETAMINE SACCHARATE, AMPHETAMINE ASPARTATE MONOHYDRATE, DEXTROAMPHETAMINE SULFATE AND AMPHETAMINE SULFATE 6.25; 6.25; 6.25; 6.25 MG/1; MG/1; MG/1; MG/1
25 CAPSULE, EXTENDED RELEASE ORAL DAILY
Qty: 30 CAPSULE | Refills: 0 | Status: SHIPPED | OUTPATIENT
Start: 2020-05-10 | End: 2020-06-12 | Stop reason: SDUPTHER

## 2020-05-13 ENCOUNTER — APPOINTMENT (OUTPATIENT)
Dept: BEHAVIORAL HEALTH | Age: 45
End: 2020-05-13

## 2020-05-28 ENCOUNTER — HOSPITAL ENCOUNTER (OUTPATIENT)
Age: 45
Discharge: HOME | End: 2020-05-28
Payer: COMMERCIAL

## 2020-05-28 VITALS
SYSTOLIC BLOOD PRESSURE: 174 MMHG | DIASTOLIC BLOOD PRESSURE: 102 MMHG | RESPIRATION RATE: 18 BRPM | TEMPERATURE: 98 F | OXYGEN SATURATION: 96 % | HEART RATE: 87 BPM

## 2020-05-28 DIAGNOSIS — R03.0 ELEVATED BLOOD PRESSURE READING: ICD-10-CM

## 2020-05-28 DIAGNOSIS — S91.332A PUNCTURE WOUND OF LEFT FOOT, INITIAL ENCOUNTER: Primary | ICD-10-CM

## 2020-05-28 PROCEDURE — 99212 OFFICE O/P EST SF 10 MIN: CPT

## 2020-05-28 NOTE — ED INITIAL ASSESSMENT (HPI)
PATIENT ARRIVED AMBULATORY TO ROOM. PATIENT STATES HE STEPPED ON A NAIL IN THE YARD 30 MINUTES PTA. PATIENT STATES \"I JUST NEED A TETANUS SHOT\" PATIENT STATES HE WAS WEARING SOCKS.  STATES IT WAS A \"CLEAN NAIL\"

## 2020-05-28 NOTE — ED PROVIDER NOTES
Patient presents with: Foot Injury      HPI:     Kavon Veliz is a 41year old male presents for a chief complaint of a puncture wound to the bottom of the left foot that occurred 20-30 minutes ago.   The patient has a small superficial puncture wound to Food insecurity:        Worry: Not on file        Inability: Not on file      Transportation needs:        Medical: Not on file        Non-medical: Not on file    Tobacco Use      Smoking status: Never Smoker      Smokeless tobacco: Never Used    Substance SpO2 96%   GENERAL: well developed, well nourished, well hydrated, no distress  SKIN: good skin turgor, no obvious rashes, see above for wound description  HEENT: atraumatic, normocephalic, ears, nose and throat are clear  EYES: sclera non icteric bilatera [id number]  [phone]    Schedule an appointment as soon as possible for a visit in 2 days  For wound re-check

## 2020-06-12 ENCOUNTER — TELEPHONE (OUTPATIENT)
Dept: BEHAVIORAL HEALTH | Age: 45
End: 2020-06-12

## 2020-06-12 RX ORDER — DEXTROAMPHETAMINE SACCHARATE, AMPHETAMINE ASPARTATE MONOHYDRATE, DEXTROAMPHETAMINE SULFATE AND AMPHETAMINE SULFATE 6.25; 6.25; 6.25; 6.25 MG/1; MG/1; MG/1; MG/1
25 CAPSULE, EXTENDED RELEASE ORAL DAILY
Qty: 30 CAPSULE | Refills: 0 | Status: SHIPPED | OUTPATIENT
Start: 2020-06-12 | End: 2020-07-22 | Stop reason: SDUPTHER

## 2020-07-06 ENCOUNTER — APPOINTMENT (OUTPATIENT)
Dept: BEHAVIORAL HEALTH | Age: 45
End: 2020-07-06

## 2020-07-06 ENCOUNTER — TELEPHONE (OUTPATIENT)
Dept: BEHAVIORAL HEALTH | Age: 45
End: 2020-07-06

## 2020-07-22 ENCOUNTER — TELEPHONE (OUTPATIENT)
Dept: BEHAVIORAL HEALTH | Age: 45
End: 2020-07-22

## 2020-07-22 RX ORDER — DEXTROAMPHETAMINE SACCHARATE, AMPHETAMINE ASPARTATE MONOHYDRATE, DEXTROAMPHETAMINE SULFATE AND AMPHETAMINE SULFATE 6.25; 6.25; 6.25; 6.25 MG/1; MG/1; MG/1; MG/1
25 CAPSULE, EXTENDED RELEASE ORAL DAILY
Qty: 30 CAPSULE | Refills: 0 | Status: SHIPPED | OUTPATIENT
Start: 2020-07-22 | End: 2020-08-24 | Stop reason: SDUPTHER

## 2020-08-24 ENCOUNTER — TELEPHONE (OUTPATIENT)
Dept: BEHAVIORAL HEALTH | Age: 45
End: 2020-08-24

## 2020-08-24 RX ORDER — DEXTROAMPHETAMINE SACCHARATE, AMPHETAMINE ASPARTATE MONOHYDRATE, DEXTROAMPHETAMINE SULFATE AND AMPHETAMINE SULFATE 6.25; 6.25; 6.25; 6.25 MG/1; MG/1; MG/1; MG/1
25 CAPSULE, EXTENDED RELEASE ORAL DAILY
Qty: 30 CAPSULE | Refills: 0 | Status: SHIPPED | OUTPATIENT
Start: 2020-08-24 | End: 2020-09-30 | Stop reason: SDUPTHER

## 2020-09-30 ENCOUNTER — TELEPHONE (OUTPATIENT)
Dept: BEHAVIORAL HEALTH | Age: 45
End: 2020-09-30

## 2020-09-30 RX ORDER — DEXTROAMPHETAMINE SACCHARATE, AMPHETAMINE ASPARTATE MONOHYDRATE, DEXTROAMPHETAMINE SULFATE AND AMPHETAMINE SULFATE 6.25; 6.25; 6.25; 6.25 MG/1; MG/1; MG/1; MG/1
25 CAPSULE, EXTENDED RELEASE ORAL DAILY
Qty: 30 CAPSULE | Refills: 0 | Status: SHIPPED | OUTPATIENT
Start: 2020-09-30 | End: 2020-11-06 | Stop reason: SDUPTHER

## 2020-10-28 ENCOUNTER — HOSPITAL ENCOUNTER (OUTPATIENT)
Age: 45
Discharge: HOME | End: 2020-10-28
Payer: COMMERCIAL

## 2020-10-28 VITALS
HEART RATE: 97 BPM | RESPIRATION RATE: 18 BRPM | TEMPERATURE: 99 F | DIASTOLIC BLOOD PRESSURE: 99 MMHG | OXYGEN SATURATION: 96 % | SYSTOLIC BLOOD PRESSURE: 160 MMHG

## 2020-10-28 DIAGNOSIS — S61.011A LACERATION OF RIGHT THUMB WITHOUT FOREIGN BODY WITHOUT DAMAGE TO NAIL, INITIAL ENCOUNTER: Primary | ICD-10-CM

## 2020-10-28 PROCEDURE — 12001 RPR S/N/AX/GEN/TRNK 2.5CM/<: CPT

## 2020-10-28 PROCEDURE — 99213 OFFICE O/P EST LOW 20 MIN: CPT

## 2020-10-28 PROCEDURE — 99212 OFFICE O/P EST SF 10 MIN: CPT

## 2020-10-28 NOTE — ED INITIAL ASSESSMENT (HPI)
PATIENT ARRIVED AMBULATORY TO ROOM C/O A LACERATION TO THE RIGHT THUMB. PATIENT STATES HE CUT HIS FINGER ON A RAZOR BLADE AT 11:30. BLEEDING CONTROLLED WITH PRESSURE.  TETANUS UTD 78 yo female with PMHX CHF, HTN, Pacemaker seen for left foot painful ulcers. Patient is seen sitting in chair, with legs hanging off chair. Patient states her wounds are very painful .Pt denies being diabetic. Pt denies N/V/C/F/SOB. Pt denies any previous injury to the foot    Allergies: Aspirin, Atorvastatin, Penicillin    01-24    132<L>  |  96<L>  |  34.0<H>  ----------------------------<  94  5.0   |  27.0  |  0.93    Ca    9.5      24 Jan 2019 07:44  Mg     2.2     01-22        PE: Left Foot  Vascular: DP/PT: non-palable due to pain; CFT< 3 sec x 5; TG: wnl; severe edema noted on the left foot and leg  Derm: onychomycosis nails noted on the left foot 1 to 5; IDM noted; ulceration noted on the dorsal digits with necrotic fibrotic base; dorsal left foot ulcer noted with fibrogranular base; mild mal odor noted; no pus noted; severe pain upon palpation; cellulitis extending to proximal leg noted;   right foot; interdigital maceration noted on the 1st interspace with dorsal wound   Neuro: Protective sensation intact    Findings: Marked diffuse osteoporosis involving the bones of the foot. No   definite evidence of fracture or dislocation. Evaluation is limited. No   definite evidence of bone destruction. No evidence of soft tissue gas.   There is diffuse soft tissue swelling..    Impression:    Diffuse soft tissue swelling. Marked diffuse osteoporosis..    ------------------  < from: US Duplex Arterial Lower Ext Compl, Bilateral (01.22.19 @ 09:06) >     EXAM:  US DPLX LWR EXT ARTS COMPL BI                          PROCEDURE DATE:  01/22/2019          INTERPRETATION:  History:Bilateral lower extremity nonhealing ulcers.   Cellulitis.    Technique: Grayscale, color ultrasound, and spectral analysisof the   lower examination arteries bilaterally    Comparison: None     Findings:     On the right side, the external iliac, common femoral, superficial   femoral, pelvic material, posterior tibial arteries, and dorsalis pedis   are triphasic/biphasic. Monophasic waveforms are seen in the anterior   tibial and peroneal arteries. The profunda femoris artery is not seen and   may BE occluded    On the left side, the external iliac artery is patent with triphasic   waveforms. Monophasic waveforms are seen from the common femoral artery   through the dorsalis pedis artery. The peroneal artery could not be seen.   The profunda femoris artery is not seen and may BE occluded.    Impression:     THERE IS GOOD INFLOW TO THE PELVIS AND OUTFLOW TO THE KNEES BILATERALLY.    THREE-VESSEL RUNOFF IS SEEN TO THE RIGHT FOOT.    TWO-VESSEL RUNOFF IS SEEN TO THE LEFT FOOT WITH OCCLUSION OF THE PERONEAL   ARTERY.    THE PROFUNDUS FEMORIS ARTERIES ARE NOT SEEN BILATERALLY, AND MAY BE   OCCLUDED.    < end of copied text >      A: Venous Stasis Ulcer of Left Foot & Leg,  Right Foot Ulcer     P:  Patient evaluated and chart reviewed  Educated pt on proper foot care and change in shoe gear  Xray reviewed; results noted above   BELINDA ordered; results pending.   Cx: Proteus Mirabilis, Klebsiella Oxytoca, Corynebacterium, Alpha Hemolytic Strep   Left Foot Ulcer evaluated, venous stasis ulcer extending to leg, possible OM of 2nd Digit. Cannot perform MRI because pt has a pacemaker.   Pt going for Indium scan today  Bilateral Foot Ulcerations dressed with Christine/Dakins/DSD  Instructed pt to keep dressing clean dry and intact to bilateral foot   WCO Placed for Bilateral Dakins/DSD. Podiatry will perform dressing in morning. Nursing staff will perform dressing in evening.   Continue IV abx as per ID recommendation   As per Podiatry continue local wound care BID, continue IV abx, f/up on Bone Scan results to r/o OM.   Podiatry will follow patient while inhouse. 78 yo female with PMHX CHF, HTN, Pacemaker seen for left foot painful ulcers. Patient is seen sitting in chair, with legs hanging off chair. Patient states her wounds are very painful .Pt denies being diabetic. Pt denies N/V/C/F/SOB. Pt denies any previous injury to the foot    Allergies: Aspirin, Atorvastatin, Penicillin    01-24    132<L>  |  96<L>  |  34.0<H>  ----------------------------<  94  5.0   |  27.0  |  0.93    Ca    9.5      24 Jan 2019 07:44  Mg     2.2     01-22        PE: Left Foot  Vascular: DP/PT: non-palable due to pain; CFT< 3 sec x 5; TG: wnl; severe edema noted on the left foot and leg  Derm: onychomycosis nails noted on the left foot 1 to 5; IDM noted; ulceration noted on the dorsal digits with necrotic fibrotic base; dorsal left foot ulcer noted with fibrogranular base; mild mal odor noted; no pus noted; severe pain upon palpation; cellulitis extending to proximal leg noted;   right foot; interdigital maceration noted on the 1st interspace with dorsal wound   Neuro: Protective sensation intact    Findings: Marked diffuse osteoporosis involving the bones of the foot. No   definite evidence of fracture or dislocation. Evaluation is limited. No   definite evidence of bone destruction. No evidence of soft tissue gas.   There is diffuse soft tissue swelling..    Impression:    Diffuse soft tissue swelling. Marked diffuse osteoporosis..    ------------------  < from: US Duplex Arterial Lower Ext Compl, Bilateral (01.22.19 @ 09:06) >     EXAM:  US DPLX LWR EXT ARTS COMPL BI                          PROCEDURE DATE:  01/22/2019          INTERPRETATION:  History:Bilateral lower extremity nonhealing ulcers.   Cellulitis.    Technique: Grayscale, color ultrasound, and spectral analysisof the   lower examination arteries bilaterally    Comparison: None     Findings:     On the right side, the external iliac, common femoral, superficial   femoral, pelvic material, posterior tibial arteries, and dorsalis pedis   are triphasic/biphasic. Monophasic waveforms are seen in the anterior   tibial and peroneal arteries. The profunda femoris artery is not seen and   may BE occluded    On the left side, the external iliac artery is patent with triphasic   waveforms. Monophasic waveforms are seen from the common femoral artery   through the dorsalis pedis artery. The peroneal artery could not be seen.   The profunda femoris artery is not seen and may BE occluded.    Impression:     THERE IS GOOD INFLOW TO THE PELVIS AND OUTFLOW TO THE KNEES BILATERALLY.    THREE-VESSEL RUNOFF IS SEEN TO THE RIGHT FOOT.    TWO-VESSEL RUNOFF IS SEEN TO THE LEFT FOOT WITH OCCLUSION OF THE PERONEAL   ARTERY.    THE PROFUNDUS FEMORIS ARTERIES ARE NOT SEEN BILATERALLY, AND MAY BE   OCCLUDED.    < end of copied text >      A: Venous Stasis Ulcer of Left Foot & Leg,  Right Foot Ulcer     P:  Patient evaluated and chart reviewed  Educated pt on proper foot care and change in shoe gear  Xray reviewed; results noted above   BELINDA ordered; results pending.   Cx: Proteus Mirabilis, Klebsiella Oxytoca, Corynebacterium, Alpha Hemolytic Strep   Left Foot Ulcer evaluated, venous stasis ulcer extending to leg, possible OM of 2nd Digit. Cannot perform MRI because pt has a pacemaker.   Pt going for Indium scan today  Bilateral Foot Ulcerations dressed with Christine/Dakins/DSD  Instructed pt to keep dressing clean dry and intact to bilateral foot   WCO Placed for Bilateral Dakins/DSD. Podiatry will perform dressing in morning. Nursing staff will perform dressing in evening.   Continue IV abx as per ID recommendation   As per Podiatry continue local wound care BID, continue IV abx, f/up on Bone Scan results to r/o OM.   Podiatry will follow patient while inhouse.      Wound Care Order  Remove Bilateral Leg Dressing  Apply Generous Amount of Dakin Soaked Gauze on Bilateral Anterior Leg   Apply Calcium alginate and silver dressing to open wounds  Apply 4x4 gauze, wrap kerlix around the foot and legs  Apply Unna boot, webril and coban over both feet and legs  Repeat 3x a week.  Pt needs to follow up in Podiatry Wound Clinic with Dr. Varner or Dr. Rodriguez for follow up wound care once discharge. 78 yo female with PMHX CHF, HTN, Pacemaker seen for left foot painful ulcers. Patient is seen sitting in chair, with legs hanging off chair but "elevated as it is on a bucket". Patient states her wounds are very painful .Pt denies being diabetic. Pt denies N/V/C/F/SOB. Pt denies any previous injury to the foot    Allergies: Aspirin, Atorvastatin, Penicillin    01-24    132<L>  |  96<L>  |  34.0<H>  ----------------------------<  94  5.0   |  27.0  |  0.93    Ca    9.5      24 Jan 2019 07:44  Mg     2.2     01-22        PE: Left Foot  Vascular: DP/PT: non-palable due to pain; CFT< 3 sec x 5; TG: wnl; severe edema noted on the left foot and leg  Derm: onychomycosis nails noted on the left foot 1 to 5; IDM noted; ulceration noted on the dorsal digits with necrotic fibrotic base; dorsal left foot ulcer noted with fibrogranular base; mild mal odor noted; no pus noted; severe pain upon palpation; cellulitis extending to proximal leg noted;   right foot; interdigital maceration noted on the 1st interspace with dorsal wound   Neuro: Protective sensation intact    Findings: Marked diffuse osteoporosis involving the bones of the foot. No   definite evidence of fracture or dislocation. Evaluation is limited. No   definite evidence of bone destruction. No evidence of soft tissue gas.   There is diffuse soft tissue swelling..    Impression:    Diffuse soft tissue swelling. Marked diffuse osteoporosis..    ------------------  < from: US Duplex Arterial Lower Ext Compl, Bilateral (01.22.19 @ 09:06) >     EXAM:  US DPLX LWR EXT ARTS COMPL BI                          PROCEDURE DATE:  01/22/2019          INTERPRETATION:  History:Bilateral lower extremity nonhealing ulcers.   Cellulitis.    Technique: Grayscale, color ultrasound, and spectral analysisof the   lower examination arteries bilaterally    Comparison: None     Findings:     On the right side, the external iliac, common femoral, superficial   femoral, pelvic material, posterior tibial arteries, and dorsalis pedis   are triphasic/biphasic. Monophasic waveforms are seen in the anterior   tibial and peroneal arteries. The profunda femoris artery is not seen and   may BE occluded    On the left side, the external iliac artery is patent with triphasic   waveforms. Monophasic waveforms are seen from the common femoral artery   through the dorsalis pedis artery. The peroneal artery could not be seen.   The profunda femoris artery is not seen and may BE occluded.    Impression:     THERE IS GOOD INFLOW TO THE PELVIS AND OUTFLOW TO THE KNEES BILATERALLY.    THREE-VESSEL RUNOFF IS SEEN TO THE RIGHT FOOT.    TWO-VESSEL RUNOFF IS SEEN TO THE LEFT FOOT WITH OCCLUSION OF THE PERONEAL   ARTERY.    THE PROFUNDUS FEMORIS ARTERIES ARE NOT SEEN BILATERALLY, AND MAY BE   OCCLUDED.    -----------------------------------------------  < from: NM Bone Marrow Imaging Limited (01.23.19 @ 12:05) >   EXAM:  NM MULTI DAY PROCEDURE                         EXAM:  NM INFLAMMATORY LOC WBC LTD                         EXAM:  NM BONE MARROW IMG LTD AREA                          PROCEDURE DATE:  01/23/2019          INTERPRETATION:  CLINICAL INFORMATION: 77 year-old female with cellulitis   and pain in the left foot, bilateral foot ulcer,  referred to evaluate   for osteomyelitis.    RADIOPHARMACEUTICAL: 520 microcuries In-111 labeled autologous   leukocytes, injected intravenously on 1/22/2019; 10.1 mCi 99mTc- sulfur   colloid, injected intravenously on 1/23/2019.    TECHNIQUE:  Static images of the feet and lower legs were obtained in the   anterior, posterior, and lateral projections approximately 24 hours   following administration of radiolabeled leukocytes. The patient was then   injected with 99mTc- sulfur colloid and images were repeated in the same   projections using dual isotope acquisition mode. Study is limited by   patient motion and inability to stay still for imaging (unable to obtain   dorsal and plantar feet).    COMPARISON: No previous Indium-labeled leukocyte/marrow scan.    FINDINGS: The study is limited by patient motion. There is congruent   uptake of radiolabeled leukocytes and 99mTc- sulfur colloid in the   visualized structures. There is mild diffuse increased tracer   accumulation in the left foot, likely related to cellulitis.    IMPRESSION: Limited combined Indium- labeled leukocyte study and marrow   scan demonstrates:    No scan evidence of osteomyelitis.       Mild diffuse increased uptake in the left foot, likely cellulitis.  -----------------------------------------        A: Venous Stasis Ulcer of Left Foot & Leg,  Right Foot Ulcer     P:  Patient evaluated and chart reviewed  Educated pt on proper foot care and change in shoe gear  Xray reviewed; results noted above   BELINDA ordered; results pending.   Cx: Proteus Mirabilis, Klebsiella Oxytoca, Corynebacterium, Alpha Hemolytic Strep   Left Foot Ulcer evaluated, venous stasis ulcer extending to leg, possible OM of 2nd Digit. Cannot perform MRI because pt has a pacemaker.    Bilateral Foot Ulcerations dressed with Christine/Dakins/DSD  Instructed pt to keep dressing clean dry and intact to bilateral foot   WCO Placed for Bilateral Dakins/DSD. Podiatry will perform dressing in morning. Nursing staff will perform dressing in evening.   Continue IV abx as per ID recommendation   As per Podiatry continue local wound care, continue IV abx,  Patient may follow up with Dr Varner or Dr Rodriguez outpatient for local wound care once a week.   Continue 3x a week dressing changes at home by nursing aid.  Podiatry will follow patient while inhouse.      Wound Care Order  Remove Bilateral Leg Dressing  Apply Generous Amount of 0.25% (quarter strength) Dakin Soaked Gauze on Bilateral Anterior Leg   Apply Calcium alginate and silver dressing to open wounds  Apply Dakins soaked 4x4 gauze to all interspaces bilaterally  Apply 4x4 gauze, wrap kerlix around the foot and legs  Apply ACE bandage over both feet and legs  Repeat 3x a week.  Pt needs to follow up in Podiatry Wound Clinic with Dr. Varner or Dr. Rodriguez for follow up wound care once discharge.

## 2020-10-28 NOTE — ED PROVIDER NOTES
Patient presents with:  Laceration/Abrasion      HPI:     April Risk is a 41year old male presents for a chief complaint of laceration evaluation and repair.   The patient cut the pad of his finger on a new razor blade while at work prior to arrival. Sexual Activity      Alcohol use: Yes        Comment: 1 per mth      Drug use: No      Sexual activity: Not on file    Lifestyle      Physical activity        Days per week: Not on file        Minutes per session: Not on file      Stress: Not on file    Re laceration description  HEENT: atraumatic, normocephalic, ears, nose and throat are clear  EYES: sclera non icteric bilateral  NECK: supple, no adenopathy  LUNGS: clear to auscultation, no RRW  CARDIO: RRR without murmur  EXTREMITIES: no cyanosis or edema.

## 2020-11-06 ENCOUNTER — TELEPHONE (OUTPATIENT)
Dept: BEHAVIORAL HEALTH | Age: 45
End: 2020-11-06

## 2020-11-06 RX ORDER — DEXTROAMPHETAMINE SACCHARATE, AMPHETAMINE ASPARTATE MONOHYDRATE, DEXTROAMPHETAMINE SULFATE AND AMPHETAMINE SULFATE 6.25; 6.25; 6.25; 6.25 MG/1; MG/1; MG/1; MG/1
25 CAPSULE, EXTENDED RELEASE ORAL DAILY
Qty: 30 CAPSULE | Refills: 0 | Status: SHIPPED | OUTPATIENT
Start: 2020-11-06 | End: 2020-11-19 | Stop reason: SDUPTHER

## 2020-11-08 ENCOUNTER — HOSPITAL ENCOUNTER (OUTPATIENT)
Age: 45
Discharge: HOME | End: 2020-11-08
Payer: COMMERCIAL

## 2020-11-08 VITALS
HEART RATE: 69 BPM | SYSTOLIC BLOOD PRESSURE: 172 MMHG | DIASTOLIC BLOOD PRESSURE: 106 MMHG | RESPIRATION RATE: 16 BRPM | TEMPERATURE: 97 F | OXYGEN SATURATION: 98 %

## 2020-11-08 DIAGNOSIS — Z48.02 ENCOUNTER FOR REMOVAL OF SUTURES: Primary | ICD-10-CM

## 2020-11-08 NOTE — ED PROVIDER NOTES
Patient Seen in: [facility]      History   Patient presents with:  Suture Removal    Stated Complaint: suture removal    HPI    42 yo male here for suture removal.  Pt had sutures placed to 4 thumb 9 days ago. Offers no complaints.     Past M General: Abdomen is flat. Musculoskeletal: Normal range of motion. Skin:     General: Skin is warm. Neurological:      General: No focal deficit present. Mental Status: He is alert and oriented to person, place, and time.    Psychiatric:

## 2020-11-08 NOTE — ED INITIAL ASSESSMENT (HPI)
PRESENTS TODAY FOR SUTURE REMOVAL X 3 TO RIGHT THUMB, STATES SUTURES WERE PLACED ON 10/30. DENIES DRAINAGE FROM THE SITE.

## 2020-11-19 ENCOUNTER — TELEPHONE (OUTPATIENT)
Dept: BEHAVIORAL HEALTH | Age: 45
End: 2020-11-19

## 2020-11-19 ENCOUNTER — V-VISIT (OUTPATIENT)
Dept: BEHAVIORAL HEALTH | Age: 45
End: 2020-11-19

## 2020-11-19 DIAGNOSIS — F90.2 ATTENTION DEFICIT HYPERACTIVITY DISORDER (ADHD), COMBINED TYPE: Primary | ICD-10-CM

## 2020-11-19 PROBLEM — E78.1 HYPERTRIGLYCERIDEMIA: Status: ACTIVE | Noted: 2018-01-09

## 2020-11-19 PROBLEM — I10 ESSENTIAL HYPERTENSION: Status: ACTIVE | Noted: 2020-11-19

## 2020-11-19 PROBLEM — G47.33 OBSTRUCTIVE SLEEP APNEA: Status: ACTIVE | Noted: 2017-12-04

## 2020-11-19 PROCEDURE — 90792 PSYCH DIAG EVAL W/MED SRVCS: CPT | Performed by: PSYCHIATRY & NEUROLOGY

## 2020-11-19 RX ORDER — DEXTROAMPHETAMINE SACCHARATE, AMPHETAMINE ASPARTATE MONOHYDRATE, DEXTROAMPHETAMINE SULFATE AND AMPHETAMINE SULFATE 6.25; 6.25; 6.25; 6.25 MG/1; MG/1; MG/1; MG/1
25 CAPSULE, EXTENDED RELEASE ORAL DAILY
Qty: 30 CAPSULE | Refills: 0 | Status: SHIPPED | OUTPATIENT
Start: 2020-11-19 | End: 2021-01-22 | Stop reason: SDUPTHER

## 2021-01-20 DIAGNOSIS — F90.2 ATTENTION DEFICIT HYPERACTIVITY DISORDER (ADHD), COMBINED TYPE: ICD-10-CM

## 2021-01-21 RX ORDER — DEXTROAMPHETAMINE SACCHARATE, AMPHETAMINE ASPARTATE MONOHYDRATE, DEXTROAMPHETAMINE SULFATE AND AMPHETAMINE SULFATE 6.25; 6.25; 6.25; 6.25 MG/1; MG/1; MG/1; MG/1
25 CAPSULE, EXTENDED RELEASE ORAL DAILY
Qty: 30 CAPSULE | Refills: 0 | OUTPATIENT
Start: 2021-01-21

## 2021-01-22 ENCOUNTER — TELEPHONIC VISIT (OUTPATIENT)
Dept: BEHAVIORAL HEALTH | Age: 46
End: 2021-01-22

## 2021-01-22 DIAGNOSIS — F90.2 ATTENTION DEFICIT HYPERACTIVITY DISORDER (ADHD), COMBINED TYPE: Primary | ICD-10-CM

## 2021-01-22 PROCEDURE — 99213 OFFICE O/P EST LOW 20 MIN: CPT | Performed by: PSYCHIATRY & NEUROLOGY

## 2021-01-22 RX ORDER — DEXTROAMPHETAMINE SACCHARATE, AMPHETAMINE ASPARTATE MONOHYDRATE, DEXTROAMPHETAMINE SULFATE AND AMPHETAMINE SULFATE 6.25; 6.25; 6.25; 6.25 MG/1; MG/1; MG/1; MG/1
25 CAPSULE, EXTENDED RELEASE ORAL DAILY
Qty: 30 CAPSULE | Refills: 0 | Status: SHIPPED | OUTPATIENT
Start: 2021-01-22 | End: 2021-03-04 | Stop reason: SDUPTHER

## 2021-01-22 ASSESSMENT — ENCOUNTER SYMPTOMS
NAUSEA: 0
VOMITING: 0

## 2021-03-04 ENCOUNTER — TELEPHONE (OUTPATIENT)
Dept: BEHAVIORAL HEALTH | Age: 46
End: 2021-03-04

## 2021-03-04 DIAGNOSIS — F90.2 ATTENTION DEFICIT HYPERACTIVITY DISORDER (ADHD), COMBINED TYPE: ICD-10-CM

## 2021-03-04 RX ORDER — DEXTROAMPHETAMINE SACCHARATE, AMPHETAMINE ASPARTATE MONOHYDRATE, DEXTROAMPHETAMINE SULFATE AND AMPHETAMINE SULFATE 6.25; 6.25; 6.25; 6.25 MG/1; MG/1; MG/1; MG/1
25 CAPSULE, EXTENDED RELEASE ORAL DAILY
Qty: 2 CAPSULE | Refills: 0 | Status: SHIPPED | OUTPATIENT
Start: 2021-03-04 | End: 2021-03-05 | Stop reason: SDUPTHER

## 2021-03-05 ENCOUNTER — TELEPHONIC VISIT (OUTPATIENT)
Dept: BEHAVIORAL HEALTH | Age: 46
End: 2021-03-05

## 2021-03-05 DIAGNOSIS — F90.2 ATTENTION DEFICIT HYPERACTIVITY DISORDER (ADHD), COMBINED TYPE: Primary | ICD-10-CM

## 2021-03-05 PROCEDURE — 99213 OFFICE O/P EST LOW 20 MIN: CPT | Performed by: PSYCHIATRY & NEUROLOGY

## 2021-03-05 RX ORDER — DEXTROAMPHETAMINE SACCHARATE, AMPHETAMINE ASPARTATE MONOHYDRATE, DEXTROAMPHETAMINE SULFATE AND AMPHETAMINE SULFATE 6.25; 6.25; 6.25; 6.25 MG/1; MG/1; MG/1; MG/1
25 CAPSULE, EXTENDED RELEASE ORAL DAILY
Qty: 30 CAPSULE | Refills: 0 | Status: SHIPPED | OUTPATIENT
Start: 2021-03-05 | End: 2021-04-07 | Stop reason: SDUPTHER

## 2021-03-05 ASSESSMENT — ENCOUNTER SYMPTOMS
NAUSEA: 0
VOMITING: 0

## 2021-04-07 ENCOUNTER — TELEPHONE (OUTPATIENT)
Dept: BEHAVIORAL HEALTH | Age: 46
End: 2021-04-07

## 2021-04-07 DIAGNOSIS — F90.2 ATTENTION DEFICIT HYPERACTIVITY DISORDER (ADHD), COMBINED TYPE: ICD-10-CM

## 2021-04-07 RX ORDER — DEXTROAMPHETAMINE SACCHARATE, AMPHETAMINE ASPARTATE MONOHYDRATE, DEXTROAMPHETAMINE SULFATE AND AMPHETAMINE SULFATE 6.25; 6.25; 6.25; 6.25 MG/1; MG/1; MG/1; MG/1
25 CAPSULE, EXTENDED RELEASE ORAL DAILY
Qty: 30 CAPSULE | Refills: 0 | Status: SHIPPED | OUTPATIENT
Start: 2021-04-07 | End: 2021-05-19 | Stop reason: SDUPTHER

## 2021-05-19 ENCOUNTER — TELEPHONIC VISIT (OUTPATIENT)
Dept: BEHAVIORAL HEALTH | Age: 46
End: 2021-05-19

## 2021-05-19 DIAGNOSIS — F90.2 ATTENTION DEFICIT HYPERACTIVITY DISORDER (ADHD), COMBINED TYPE: ICD-10-CM

## 2021-05-19 DIAGNOSIS — F51.01 PRIMARY INSOMNIA: Primary | ICD-10-CM

## 2021-05-19 PROCEDURE — 99214 OFFICE O/P EST MOD 30 MIN: CPT | Performed by: PSYCHIATRY & NEUROLOGY

## 2021-05-19 RX ORDER — DEXTROAMPHETAMINE SACCHARATE, AMPHETAMINE ASPARTATE MONOHYDRATE, DEXTROAMPHETAMINE SULFATE AND AMPHETAMINE SULFATE 6.25; 6.25; 6.25; 6.25 MG/1; MG/1; MG/1; MG/1
25 CAPSULE, EXTENDED RELEASE ORAL DAILY
Qty: 30 CAPSULE | Refills: 0 | Status: SHIPPED | OUTPATIENT
Start: 2021-05-19 | End: 2021-06-18 | Stop reason: SDUPTHER

## 2021-05-19 RX ORDER — TRAZODONE HYDROCHLORIDE 50 MG/1
50-100 TABLET ORAL NIGHTLY PRN
Qty: 60 TABLET | Refills: 0 | Status: SHIPPED | OUTPATIENT
Start: 2021-05-19 | End: 2021-06-18 | Stop reason: SDUPTHER

## 2021-06-14 DIAGNOSIS — F51.01 PRIMARY INSOMNIA: ICD-10-CM

## 2021-06-14 RX ORDER — TRAZODONE HYDROCHLORIDE 50 MG/1
TABLET ORAL
Qty: 60 TABLET | Refills: 0 | OUTPATIENT
Start: 2021-06-14

## 2021-06-18 ENCOUNTER — TELEPHONIC VISIT (OUTPATIENT)
Dept: BEHAVIORAL HEALTH | Age: 46
End: 2021-06-18

## 2021-06-18 DIAGNOSIS — F90.2 ATTENTION DEFICIT HYPERACTIVITY DISORDER (ADHD), COMBINED TYPE: ICD-10-CM

## 2021-06-18 DIAGNOSIS — F51.01 PRIMARY INSOMNIA: Primary | ICD-10-CM

## 2021-06-18 PROCEDURE — 99214 OFFICE O/P EST MOD 30 MIN: CPT | Performed by: PSYCHIATRY & NEUROLOGY

## 2021-06-18 RX ORDER — TRAZODONE HYDROCHLORIDE 50 MG/1
50-100 TABLET ORAL NIGHTLY PRN
Qty: 180 TABLET | Refills: 0 | Status: SHIPPED | OUTPATIENT
Start: 2021-06-18 | End: 2021-09-10 | Stop reason: ALTCHOICE

## 2021-06-18 RX ORDER — DEXTROAMPHETAMINE SACCHARATE, AMPHETAMINE ASPARTATE MONOHYDRATE, DEXTROAMPHETAMINE SULFATE AND AMPHETAMINE SULFATE 6.25; 6.25; 6.25; 6.25 MG/1; MG/1; MG/1; MG/1
25 CAPSULE, EXTENDED RELEASE ORAL DAILY
Qty: 30 CAPSULE | Refills: 0 | Status: SHIPPED | OUTPATIENT
Start: 2021-06-18 | End: 2021-07-26 | Stop reason: SDUPTHER

## 2021-06-24 NOTE — TELEPHONE ENCOUNTER
Pt was informed of lab results. Wanted PCP to know he was not fasting prior to this collection.   In fact \" I ate a huge breakfast right before I had my blood taken\"
That can affect the triglycerides but not the hemoglobin A1c. Thank you for informing. Noted.
no

## 2021-07-26 DIAGNOSIS — F90.2 ATTENTION DEFICIT HYPERACTIVITY DISORDER (ADHD), COMBINED TYPE: ICD-10-CM

## 2021-07-26 RX ORDER — DEXTROAMPHETAMINE SACCHARATE, AMPHETAMINE ASPARTATE MONOHYDRATE, DEXTROAMPHETAMINE SULFATE AND AMPHETAMINE SULFATE 6.25; 6.25; 6.25; 6.25 MG/1; MG/1; MG/1; MG/1
25 CAPSULE, EXTENDED RELEASE ORAL DAILY
Qty: 30 CAPSULE | Refills: 0 | Status: SHIPPED | OUTPATIENT
Start: 2021-07-26 | End: 2021-09-07 | Stop reason: SDUPTHER

## 2021-09-07 ENCOUNTER — TELEPHONE (OUTPATIENT)
Dept: BEHAVIORAL HEALTH | Age: 46
End: 2021-09-07

## 2021-09-07 DIAGNOSIS — F90.2 ATTENTION DEFICIT HYPERACTIVITY DISORDER (ADHD), COMBINED TYPE: ICD-10-CM

## 2021-09-07 RX ORDER — DEXTROAMPHETAMINE SACCHARATE, AMPHETAMINE ASPARTATE MONOHYDRATE, DEXTROAMPHETAMINE SULFATE AND AMPHETAMINE SULFATE 6.25; 6.25; 6.25; 6.25 MG/1; MG/1; MG/1; MG/1
25 CAPSULE, EXTENDED RELEASE ORAL DAILY
Qty: 10 CAPSULE | Refills: 0 | Status: SHIPPED | OUTPATIENT
Start: 2021-09-07 | End: 2021-09-10 | Stop reason: SDUPTHER

## 2021-09-10 ENCOUNTER — TELEPHONIC VISIT (OUTPATIENT)
Dept: BEHAVIORAL HEALTH | Age: 46
End: 2021-09-10

## 2021-09-10 DIAGNOSIS — F90.2 ATTENTION DEFICIT HYPERACTIVITY DISORDER (ADHD), COMBINED TYPE: ICD-10-CM

## 2021-09-10 DIAGNOSIS — F51.01 PRIMARY INSOMNIA: Primary | ICD-10-CM

## 2021-09-10 PROCEDURE — 99214 OFFICE O/P EST MOD 30 MIN: CPT | Performed by: PSYCHIATRY & NEUROLOGY

## 2021-09-10 RX ORDER — DEXTROAMPHETAMINE SACCHARATE, AMPHETAMINE ASPARTATE MONOHYDRATE, DEXTROAMPHETAMINE SULFATE AND AMPHETAMINE SULFATE 6.25; 6.25; 6.25; 6.25 MG/1; MG/1; MG/1; MG/1
25 CAPSULE, EXTENDED RELEASE ORAL DAILY
Qty: 30 CAPSULE | Refills: 0 | Status: SHIPPED | OUTPATIENT
Start: 2021-09-10 | End: 2021-10-25 | Stop reason: SDUPTHER

## 2021-09-10 RX ORDER — HYDROXYZINE 50 MG/1
50 TABLET, FILM COATED ORAL NIGHTLY PRN
Qty: 30 TABLET | Refills: 2 | Status: SHIPPED | OUTPATIENT
Start: 2021-09-10 | End: 2021-12-03 | Stop reason: SDUPTHER

## 2021-10-25 ENCOUNTER — TELEPHONE (OUTPATIENT)
Dept: BEHAVIORAL HEALTH | Age: 46
End: 2021-10-25

## 2021-10-25 DIAGNOSIS — F90.2 ATTENTION DEFICIT HYPERACTIVITY DISORDER (ADHD), COMBINED TYPE: ICD-10-CM

## 2021-10-25 RX ORDER — DEXTROAMPHETAMINE SACCHARATE, AMPHETAMINE ASPARTATE MONOHYDRATE, DEXTROAMPHETAMINE SULFATE AND AMPHETAMINE SULFATE 6.25; 6.25; 6.25; 6.25 MG/1; MG/1; MG/1; MG/1
25 CAPSULE, EXTENDED RELEASE ORAL DAILY
Qty: 30 CAPSULE | Refills: 0 | Status: SHIPPED | OUTPATIENT
Start: 2021-10-25 | End: 2021-12-03 | Stop reason: SDUPTHER

## 2021-12-03 ENCOUNTER — TELEPHONE (OUTPATIENT)
Dept: BEHAVIORAL HEALTH | Age: 46
End: 2021-12-03

## 2021-12-03 ENCOUNTER — BEHAVIORAL HEALTH (OUTPATIENT)
Dept: BEHAVIORAL HEALTH | Age: 46
End: 2021-12-03

## 2021-12-03 DIAGNOSIS — F51.01 PRIMARY INSOMNIA: Primary | ICD-10-CM

## 2021-12-03 DIAGNOSIS — F90.2 ATTENTION DEFICIT HYPERACTIVITY DISORDER (ADHD), COMBINED TYPE: ICD-10-CM

## 2021-12-03 PROCEDURE — 99214 OFFICE O/P EST MOD 30 MIN: CPT | Performed by: PSYCHIATRY & NEUROLOGY

## 2021-12-03 RX ORDER — DEXTROAMPHETAMINE SACCHARATE, AMPHETAMINE ASPARTATE MONOHYDRATE, DEXTROAMPHETAMINE SULFATE AND AMPHETAMINE SULFATE 6.25; 6.25; 6.25; 6.25 MG/1; MG/1; MG/1; MG/1
25 CAPSULE, EXTENDED RELEASE ORAL DAILY
Qty: 30 CAPSULE | Refills: 0 | Status: SHIPPED | OUTPATIENT
Start: 2021-12-03 | End: 2022-01-11 | Stop reason: SDUPTHER

## 2021-12-03 RX ORDER — HYDROXYZINE 50 MG/1
50 TABLET, FILM COATED ORAL NIGHTLY PRN
Qty: 30 TABLET | Refills: 2 | Status: SHIPPED | OUTPATIENT
Start: 2021-12-03 | End: 2022-01-11 | Stop reason: SDUPTHER

## 2021-12-26 ENCOUNTER — HOSPITAL ENCOUNTER (OUTPATIENT)
Age: 46
Discharge: HOME | End: 2021-12-26
Attending: EMERGENCY MEDICINE
Payer: COMMERCIAL

## 2021-12-26 VITALS
OXYGEN SATURATION: 97 % | TEMPERATURE: 99 F | HEART RATE: 74 BPM | DIASTOLIC BLOOD PRESSURE: 118 MMHG | RESPIRATION RATE: 20 BRPM | SYSTOLIC BLOOD PRESSURE: 170 MMHG

## 2021-12-26 DIAGNOSIS — I10 UNCONTROLLED HYPERTENSION: ICD-10-CM

## 2021-12-26 DIAGNOSIS — J06.9 VIRAL URI: Primary | ICD-10-CM

## 2021-12-26 LAB — SARS-COV-2 RNA RESP QL NAA+PROBE: NOT DETECTED

## 2021-12-26 PROCEDURE — 99214 OFFICE O/P EST MOD 30 MIN: CPT

## 2021-12-26 PROCEDURE — 99213 OFFICE O/P EST LOW 20 MIN: CPT

## 2021-12-26 RX ORDER — LISINOPRIL 10 MG/1
10 TABLET ORAL DAILY
Qty: 30 TABLET | Refills: 0 | Status: SHIPPED | OUTPATIENT
Start: 2021-12-26

## 2021-12-26 NOTE — ED PROVIDER NOTES
Patient Seen in: [facility]      History   Patient presents with:  Cough/URI    Stated Complaint: runny nose, headache, cough x 14 days    Subjective:   HPI    The patient is a 41-year-old male with a past history of hypertension (currently o posterior pharyngeal erythema  Chest: Clear to auscultation, no tenderness  Cardiovascular: Regular rate and rhythm without murmur  Abdomen: Soft, nontender and nondistended  Neurologic: Patient is awake, alert and oriented ×3.   The patient's motor strengt

## 2021-12-26 NOTE — ED INITIAL ASSESSMENT (HPI)
Cough and congestion for 1 week. No fever. Elevated BP for a couple weeks. States he used to take BP meds but stopped when it was controlled.

## 2022-01-11 ENCOUNTER — BEHAVIORAL HEALTH (OUTPATIENT)
Dept: BEHAVIORAL HEALTH | Age: 47
End: 2022-01-11

## 2022-01-11 DIAGNOSIS — F90.2 ATTENTION DEFICIT HYPERACTIVITY DISORDER (ADHD), COMBINED TYPE: ICD-10-CM

## 2022-01-11 DIAGNOSIS — F51.01 PRIMARY INSOMNIA: Primary | ICD-10-CM

## 2022-01-11 PROCEDURE — 99214 OFFICE O/P EST MOD 30 MIN: CPT | Performed by: PSYCHIATRY & NEUROLOGY

## 2022-01-11 RX ORDER — HYDROXYZINE 50 MG/1
50 TABLET, FILM COATED ORAL NIGHTLY PRN
Qty: 30 TABLET | Refills: 2 | Status: SHIPPED | OUTPATIENT
Start: 2022-01-11 | End: 2022-04-05 | Stop reason: SDUPTHER

## 2022-01-11 RX ORDER — DEXTROAMPHETAMINE SACCHARATE, AMPHETAMINE ASPARTATE MONOHYDRATE, DEXTROAMPHETAMINE SULFATE AND AMPHETAMINE SULFATE 6.25; 6.25; 6.25; 6.25 MG/1; MG/1; MG/1; MG/1
25 CAPSULE, EXTENDED RELEASE ORAL DAILY
Qty: 30 CAPSULE | Refills: 0 | Status: SHIPPED | OUTPATIENT
Start: 2022-01-11 | End: 2022-02-16 | Stop reason: SDUPTHER

## 2022-01-24 ENCOUNTER — HOSPITAL ENCOUNTER (OUTPATIENT)
Age: 47
Discharge: HOME | End: 2022-01-24
Payer: COMMERCIAL

## 2022-01-24 NOTE — ED INITIAL ASSESSMENT (HPI)
Patient presents for asymptomatic covid testing. Denies any complaints. States needs test for personal use.

## 2022-01-25 LAB — SARS-COV-2 RNA RESP QL NAA+PROBE: DETECTED

## 2022-02-16 ENCOUNTER — TELEPHONE (OUTPATIENT)
Dept: BEHAVIORAL HEALTH | Age: 47
End: 2022-02-16

## 2022-02-16 DIAGNOSIS — F90.2 ATTENTION DEFICIT HYPERACTIVITY DISORDER (ADHD), COMBINED TYPE: ICD-10-CM

## 2022-02-16 RX ORDER — DEXTROAMPHETAMINE SACCHARATE, AMPHETAMINE ASPARTATE MONOHYDRATE, DEXTROAMPHETAMINE SULFATE AND AMPHETAMINE SULFATE 6.25; 6.25; 6.25; 6.25 MG/1; MG/1; MG/1; MG/1
25 CAPSULE, EXTENDED RELEASE ORAL DAILY
Qty: 30 CAPSULE | Refills: 0 | Status: SHIPPED | OUTPATIENT
Start: 2022-02-16 | End: 2022-03-30 | Stop reason: SDUPTHER

## 2022-03-30 ENCOUNTER — TELEPHONE (OUTPATIENT)
Dept: BEHAVIORAL HEALTH | Age: 47
End: 2022-03-30

## 2022-03-30 DIAGNOSIS — F90.2 ATTENTION DEFICIT HYPERACTIVITY DISORDER (ADHD), COMBINED TYPE: ICD-10-CM

## 2022-03-30 RX ORDER — DEXTROAMPHETAMINE SACCHARATE, AMPHETAMINE ASPARTATE MONOHYDRATE, DEXTROAMPHETAMINE SULFATE AND AMPHETAMINE SULFATE 6.25; 6.25; 6.25; 6.25 MG/1; MG/1; MG/1; MG/1
25 CAPSULE, EXTENDED RELEASE ORAL DAILY
Qty: 30 CAPSULE | Refills: 0 | Status: SHIPPED | OUTPATIENT
Start: 2022-03-30 | End: 2022-04-05 | Stop reason: SDUPTHER

## 2022-04-05 ENCOUNTER — BEHAVIORAL HEALTH (OUTPATIENT)
Dept: BEHAVIORAL HEALTH | Age: 47
End: 2022-04-05

## 2022-04-05 DIAGNOSIS — F51.01 PRIMARY INSOMNIA: Primary | ICD-10-CM

## 2022-04-05 DIAGNOSIS — F90.2 ATTENTION DEFICIT HYPERACTIVITY DISORDER (ADHD), COMBINED TYPE: ICD-10-CM

## 2022-04-05 PROCEDURE — 99214 OFFICE O/P EST MOD 30 MIN: CPT | Performed by: PSYCHIATRY & NEUROLOGY

## 2022-04-05 RX ORDER — DEXTROAMPHETAMINE SACCHARATE, AMPHETAMINE ASPARTATE MONOHYDRATE, DEXTROAMPHETAMINE SULFATE AND AMPHETAMINE SULFATE 6.25; 6.25; 6.25; 6.25 MG/1; MG/1; MG/1; MG/1
25 CAPSULE, EXTENDED RELEASE ORAL DAILY
Qty: 30 CAPSULE | Refills: 0 | Status: SHIPPED | OUTPATIENT
Start: 2022-04-05 | End: 2022-05-03 | Stop reason: SDUPTHER

## 2022-04-05 RX ORDER — HYDROXYZINE 50 MG/1
25 TABLET, FILM COATED ORAL NIGHTLY PRN
Status: SHIPPED | COMMUNITY
Start: 2022-04-05 | End: 2022-06-28 | Stop reason: SDUPTHER

## 2022-05-03 ENCOUNTER — TELEPHONE (OUTPATIENT)
Dept: BEHAVIORAL HEALTH | Age: 47
End: 2022-05-03

## 2022-05-03 DIAGNOSIS — F90.2 ATTENTION DEFICIT HYPERACTIVITY DISORDER (ADHD), COMBINED TYPE: ICD-10-CM

## 2022-05-03 RX ORDER — DEXTROAMPHETAMINE SACCHARATE, AMPHETAMINE ASPARTATE MONOHYDRATE, DEXTROAMPHETAMINE SULFATE AND AMPHETAMINE SULFATE 6.25; 6.25; 6.25; 6.25 MG/1; MG/1; MG/1; MG/1
25 CAPSULE, EXTENDED RELEASE ORAL DAILY
Qty: 30 CAPSULE | Refills: 0 | Status: SHIPPED | OUTPATIENT
Start: 2022-05-03 | End: 2022-06-14 | Stop reason: SDUPTHER

## 2022-05-09 ENCOUNTER — TELEPHONE (OUTPATIENT)
Dept: BEHAVIORAL HEALTH | Age: 47
End: 2022-05-09

## 2022-06-14 ENCOUNTER — TELEPHONE (OUTPATIENT)
Dept: BEHAVIORAL HEALTH | Age: 47
End: 2022-06-14

## 2022-06-14 DIAGNOSIS — F90.2 ATTENTION DEFICIT HYPERACTIVITY DISORDER (ADHD), COMBINED TYPE: ICD-10-CM

## 2022-06-14 RX ORDER — DEXTROAMPHETAMINE SACCHARATE, AMPHETAMINE ASPARTATE MONOHYDRATE, DEXTROAMPHETAMINE SULFATE AND AMPHETAMINE SULFATE 6.25; 6.25; 6.25; 6.25 MG/1; MG/1; MG/1; MG/1
25 CAPSULE, EXTENDED RELEASE ORAL DAILY
Qty: 30 CAPSULE | Refills: 0 | Status: SHIPPED | OUTPATIENT
Start: 2022-06-14 | End: 2022-07-19 | Stop reason: SDUPTHER

## 2022-06-28 ENCOUNTER — BEHAVIORAL HEALTH (OUTPATIENT)
Dept: BEHAVIORAL HEALTH | Age: 47
End: 2022-06-28

## 2022-06-28 DIAGNOSIS — F90.2 ATTENTION DEFICIT HYPERACTIVITY DISORDER (ADHD), COMBINED TYPE: ICD-10-CM

## 2022-06-28 DIAGNOSIS — F51.01 PRIMARY INSOMNIA: Primary | ICD-10-CM

## 2022-06-28 PROCEDURE — 99214 OFFICE O/P EST MOD 30 MIN: CPT | Performed by: PSYCHIATRY & NEUROLOGY

## 2022-06-28 RX ORDER — HYDROXYZINE HYDROCHLORIDE 25 MG/1
25 TABLET, FILM COATED ORAL NIGHTLY PRN
Qty: 90 TABLET | Refills: 0 | Status: SHIPPED | OUTPATIENT
Start: 2022-06-28 | End: 2022-09-27 | Stop reason: SDUPTHER

## 2022-06-28 RX ORDER — TEMAZEPAM 15 MG/1
15 CAPSULE ORAL NIGHTLY PRN
Qty: 30 CAPSULE | Refills: 2 | Status: SHIPPED | OUTPATIENT
Start: 2022-06-28 | End: 2022-09-27 | Stop reason: ALTCHOICE

## 2022-06-28 RX ORDER — DEXTROAMPHETAMINE SACCHARATE, AMPHETAMINE ASPARTATE MONOHYDRATE, DEXTROAMPHETAMINE SULFATE AND AMPHETAMINE SULFATE 6.25; 6.25; 6.25; 6.25 MG/1; MG/1; MG/1; MG/1
25 CAPSULE, EXTENDED RELEASE ORAL DAILY
Qty: 30 CAPSULE | Refills: 0 | Status: SHIPPED | OUTPATIENT
Start: 2022-06-28 | End: 2022-07-19 | Stop reason: ALTCHOICE

## 2022-07-19 ENCOUNTER — TELEPHONE (OUTPATIENT)
Dept: BEHAVIORAL HEALTH | Age: 47
End: 2022-07-19

## 2022-07-19 DIAGNOSIS — F90.2 ATTENTION DEFICIT HYPERACTIVITY DISORDER (ADHD), COMBINED TYPE: ICD-10-CM

## 2022-07-19 RX ORDER — DEXTROAMPHETAMINE SACCHARATE, AMPHETAMINE ASPARTATE MONOHYDRATE, DEXTROAMPHETAMINE SULFATE AND AMPHETAMINE SULFATE 6.25; 6.25; 6.25; 6.25 MG/1; MG/1; MG/1; MG/1
25 CAPSULE, EXTENDED RELEASE ORAL DAILY
Qty: 30 CAPSULE | Refills: 0 | Status: SHIPPED | OUTPATIENT
Start: 2022-07-19 | End: 2022-08-26 | Stop reason: SDUPTHER

## 2022-08-25 ENCOUNTER — TELEPHONE (OUTPATIENT)
Dept: BEHAVIORAL HEALTH | Age: 47
End: 2022-08-25

## 2022-08-25 DIAGNOSIS — F90.2 ATTENTION DEFICIT HYPERACTIVITY DISORDER (ADHD), COMBINED TYPE: ICD-10-CM

## 2022-08-26 RX ORDER — DEXTROAMPHETAMINE SACCHARATE, AMPHETAMINE ASPARTATE MONOHYDRATE, DEXTROAMPHETAMINE SULFATE AND AMPHETAMINE SULFATE 6.25; 6.25; 6.25; 6.25 MG/1; MG/1; MG/1; MG/1
25 CAPSULE, EXTENDED RELEASE ORAL DAILY
Qty: 30 CAPSULE | Refills: 0 | Status: SHIPPED | OUTPATIENT
Start: 2022-08-26 | End: 2022-09-27 | Stop reason: SDUPTHER

## 2022-09-27 ENCOUNTER — BEHAVIORAL HEALTH (OUTPATIENT)
Dept: BEHAVIORAL HEALTH | Age: 47
End: 2022-09-27

## 2022-09-27 DIAGNOSIS — F90.2 ATTENTION DEFICIT HYPERACTIVITY DISORDER (ADHD), COMBINED TYPE: ICD-10-CM

## 2022-09-27 DIAGNOSIS — F51.01 PRIMARY INSOMNIA: Primary | ICD-10-CM

## 2022-09-27 PROCEDURE — 99214 OFFICE O/P EST MOD 30 MIN: CPT | Performed by: PSYCHIATRY & NEUROLOGY

## 2022-09-27 RX ORDER — MIRTAZAPINE 15 MG/1
15 TABLET, FILM COATED ORAL NIGHTLY
Qty: 30 TABLET | Refills: 0 | Status: SHIPPED | OUTPATIENT
Start: 2022-09-27 | End: 2022-12-28 | Stop reason: SDUPTHER

## 2022-09-27 RX ORDER — DEXTROAMPHETAMINE SACCHARATE, AMPHETAMINE ASPARTATE MONOHYDRATE, DEXTROAMPHETAMINE SULFATE AND AMPHETAMINE SULFATE 6.25; 6.25; 6.25; 6.25 MG/1; MG/1; MG/1; MG/1
25 CAPSULE, EXTENDED RELEASE ORAL DAILY
Qty: 30 CAPSULE | Refills: 0 | Status: SHIPPED | OUTPATIENT
Start: 2022-09-27 | End: 2022-11-08 | Stop reason: SDUPTHER

## 2022-09-27 RX ORDER — HYDROXYZINE HYDROCHLORIDE 25 MG/1
25 TABLET, FILM COATED ORAL NIGHTLY PRN
Qty: 90 TABLET | Refills: 0 | Status: SHIPPED | OUTPATIENT
Start: 2022-09-27 | End: 2022-12-28 | Stop reason: SDUPTHER

## 2022-11-08 DIAGNOSIS — F90.2 ATTENTION DEFICIT HYPERACTIVITY DISORDER (ADHD), COMBINED TYPE: ICD-10-CM

## 2022-11-08 RX ORDER — DEXTROAMPHETAMINE SACCHARATE, AMPHETAMINE ASPARTATE MONOHYDRATE, DEXTROAMPHETAMINE SULFATE AND AMPHETAMINE SULFATE 6.25; 6.25; 6.25; 6.25 MG/1; MG/1; MG/1; MG/1
25 CAPSULE, EXTENDED RELEASE ORAL DAILY
Qty: 30 CAPSULE | Refills: 0 | Status: SHIPPED | OUTPATIENT
Start: 2022-11-08 | End: 2022-12-13 | Stop reason: SDUPTHER

## 2022-12-13 DIAGNOSIS — F90.2 ATTENTION DEFICIT HYPERACTIVITY DISORDER (ADHD), COMBINED TYPE: ICD-10-CM

## 2022-12-13 RX ORDER — DEXTROAMPHETAMINE SACCHARATE, AMPHETAMINE ASPARTATE MONOHYDRATE, DEXTROAMPHETAMINE SULFATE AND AMPHETAMINE SULFATE 6.25; 6.25; 6.25; 6.25 MG/1; MG/1; MG/1; MG/1
25 CAPSULE, EXTENDED RELEASE ORAL DAILY
Qty: 30 CAPSULE | Refills: 0 | Status: SHIPPED | OUTPATIENT
Start: 2022-12-13 | End: 2022-12-28 | Stop reason: SDUPTHER

## 2022-12-28 ENCOUNTER — BEHAVIORAL HEALTH (OUTPATIENT)
Dept: BEHAVIORAL HEALTH | Age: 47
End: 2022-12-28

## 2022-12-28 DIAGNOSIS — F90.2 ATTENTION DEFICIT HYPERACTIVITY DISORDER (ADHD), COMBINED TYPE: ICD-10-CM

## 2022-12-28 DIAGNOSIS — F51.01 PRIMARY INSOMNIA: Primary | ICD-10-CM

## 2022-12-28 PROCEDURE — 99214 OFFICE O/P EST MOD 30 MIN: CPT | Performed by: PSYCHIATRY & NEUROLOGY

## 2022-12-28 RX ORDER — MIRTAZAPINE 15 MG/1
15 TABLET, FILM COATED ORAL NIGHTLY
Qty: 90 TABLET | Refills: 0 | Status: SHIPPED | OUTPATIENT
Start: 2022-12-28 | End: 2023-03-22 | Stop reason: ALTCHOICE

## 2022-12-28 RX ORDER — HYDROXYZINE HYDROCHLORIDE 25 MG/1
25 TABLET, FILM COATED ORAL NIGHTLY PRN
Qty: 90 TABLET | Refills: 0 | Status: SHIPPED | OUTPATIENT
Start: 2022-12-28 | End: 2022-12-28 | Stop reason: SDUPTHER

## 2022-12-28 RX ORDER — HYDROXYZINE HYDROCHLORIDE 25 MG/1
25 TABLET, FILM COATED ORAL NIGHTLY PRN
Qty: 270 TABLET | Refills: 0 | Status: SHIPPED | OUTPATIENT
Start: 2022-12-28 | End: 2023-03-22 | Stop reason: SDUPTHER

## 2022-12-28 RX ORDER — DEXTROAMPHETAMINE SACCHARATE, AMPHETAMINE ASPARTATE MONOHYDRATE, DEXTROAMPHETAMINE SULFATE AND AMPHETAMINE SULFATE 6.25; 6.25; 6.25; 6.25 MG/1; MG/1; MG/1; MG/1
25 CAPSULE, EXTENDED RELEASE ORAL DAILY
Qty: 30 CAPSULE | Refills: 0 | Status: SHIPPED | OUTPATIENT
Start: 2022-12-28 | End: 2023-01-23 | Stop reason: SDUPTHER

## 2022-12-28 RX ORDER — MIRTAZAPINE 15 MG/1
15 TABLET, FILM COATED ORAL NIGHTLY
Qty: 30 TABLET | Refills: 0 | Status: SHIPPED | OUTPATIENT
Start: 2022-12-28 | End: 2022-12-28 | Stop reason: SDUPTHER

## 2023-01-23 DIAGNOSIS — F90.2 ATTENTION DEFICIT HYPERACTIVITY DISORDER (ADHD), COMBINED TYPE: ICD-10-CM

## 2023-01-23 RX ORDER — DEXTROAMPHETAMINE SACCHARATE, AMPHETAMINE ASPARTATE MONOHYDRATE, DEXTROAMPHETAMINE SULFATE AND AMPHETAMINE SULFATE 6.25; 6.25; 6.25; 6.25 MG/1; MG/1; MG/1; MG/1
25 CAPSULE, EXTENDED RELEASE ORAL DAILY
Qty: 30 CAPSULE | Refills: 0 | Status: SHIPPED | OUTPATIENT
Start: 2023-01-23 | End: 2023-02-27 | Stop reason: SDUPTHER

## 2023-02-27 DIAGNOSIS — F90.2 ATTENTION DEFICIT HYPERACTIVITY DISORDER (ADHD), COMBINED TYPE: ICD-10-CM

## 2023-02-27 RX ORDER — DEXTROAMPHETAMINE SACCHARATE, AMPHETAMINE ASPARTATE MONOHYDRATE, DEXTROAMPHETAMINE SULFATE AND AMPHETAMINE SULFATE 6.25; 6.25; 6.25; 6.25 MG/1; MG/1; MG/1; MG/1
25 CAPSULE, EXTENDED RELEASE ORAL DAILY
Qty: 30 CAPSULE | Refills: 0 | Status: SHIPPED | OUTPATIENT
Start: 2023-02-27 | End: 2023-03-22 | Stop reason: SDUPTHER

## 2023-03-22 ENCOUNTER — BEHAVIORAL HEALTH (OUTPATIENT)
Dept: BEHAVIORAL HEALTH | Age: 48
End: 2023-03-22

## 2023-03-22 DIAGNOSIS — F90.2 ATTENTION DEFICIT HYPERACTIVITY DISORDER (ADHD), COMBINED TYPE: ICD-10-CM

## 2023-03-22 DIAGNOSIS — F51.01 PRIMARY INSOMNIA: Primary | ICD-10-CM

## 2023-03-22 PROCEDURE — 99214 OFFICE O/P EST MOD 30 MIN: CPT | Performed by: PSYCHIATRY & NEUROLOGY

## 2023-03-22 RX ORDER — DEXTROAMPHETAMINE SACCHARATE, AMPHETAMINE ASPARTATE MONOHYDRATE, DEXTROAMPHETAMINE SULFATE AND AMPHETAMINE SULFATE 6.25; 6.25; 6.25; 6.25 MG/1; MG/1; MG/1; MG/1
25 CAPSULE, EXTENDED RELEASE ORAL DAILY
Qty: 30 CAPSULE | Refills: 0 | Status: SHIPPED | OUTPATIENT
Start: 2023-03-22 | End: 2023-04-18 | Stop reason: SDUPTHER

## 2023-03-22 RX ORDER — HYDROXYZINE HYDROCHLORIDE 25 MG/1
25 TABLET, FILM COATED ORAL NIGHTLY PRN
Qty: 270 TABLET | Refills: 0 | Status: SHIPPED | OUTPATIENT
Start: 2023-03-22 | End: 2023-04-18 | Stop reason: SDUPTHER

## 2023-04-04 DIAGNOSIS — F90.2 ATTENTION DEFICIT HYPERACTIVITY DISORDER (ADHD), COMBINED TYPE: ICD-10-CM

## 2023-04-04 RX ORDER — DEXTROAMPHETAMINE SACCHARATE, AMPHETAMINE ASPARTATE MONOHYDRATE, DEXTROAMPHETAMINE SULFATE AND AMPHETAMINE SULFATE 6.25; 6.25; 6.25; 6.25 MG/1; MG/1; MG/1; MG/1
25 CAPSULE, EXTENDED RELEASE ORAL DAILY
Qty: 30 CAPSULE | Refills: 0 | OUTPATIENT
Start: 2023-04-04

## 2023-04-18 ENCOUNTER — BEHAVIORAL HEALTH (OUTPATIENT)
Dept: BEHAVIORAL HEALTH | Age: 48
End: 2023-04-18

## 2023-04-18 DIAGNOSIS — F51.01 PRIMARY INSOMNIA: Primary | ICD-10-CM

## 2023-04-18 DIAGNOSIS — F90.2 ATTENTION DEFICIT HYPERACTIVITY DISORDER (ADHD), COMBINED TYPE: ICD-10-CM

## 2023-04-18 PROCEDURE — 99214 OFFICE O/P EST MOD 30 MIN: CPT | Performed by: PSYCHIATRY & NEUROLOGY

## 2023-04-18 RX ORDER — DEXTROAMPHETAMINE SACCHARATE, AMPHETAMINE ASPARTATE MONOHYDRATE, DEXTROAMPHETAMINE SULFATE AND AMPHETAMINE SULFATE 6.25; 6.25; 6.25; 6.25 MG/1; MG/1; MG/1; MG/1
25 CAPSULE, EXTENDED RELEASE ORAL DAILY
Qty: 30 CAPSULE | Refills: 0 | Status: SHIPPED | OUTPATIENT
Start: 2023-04-18 | End: 2023-05-15 | Stop reason: SDUPTHER

## 2023-04-18 RX ORDER — HYDROXYZINE HYDROCHLORIDE 25 MG/1
25 TABLET, FILM COATED ORAL NIGHTLY PRN
Qty: 270 TABLET | Refills: 0 | Status: SHIPPED | OUTPATIENT
Start: 2023-04-18 | End: 2023-07-06 | Stop reason: SDUPTHER

## 2023-05-15 DIAGNOSIS — F90.2 ATTENTION DEFICIT HYPERACTIVITY DISORDER (ADHD), COMBINED TYPE: ICD-10-CM

## 2023-05-15 RX ORDER — DEXTROAMPHETAMINE SACCHARATE, AMPHETAMINE ASPARTATE MONOHYDRATE, DEXTROAMPHETAMINE SULFATE AND AMPHETAMINE SULFATE 6.25; 6.25; 6.25; 6.25 MG/1; MG/1; MG/1; MG/1
25 CAPSULE, EXTENDED RELEASE ORAL DAILY
Qty: 30 CAPSULE | Refills: 0 | Status: SHIPPED | OUTPATIENT
Start: 2023-05-15 | End: 2023-06-20 | Stop reason: SDUPTHER

## 2023-06-20 DIAGNOSIS — F90.2 ATTENTION DEFICIT HYPERACTIVITY DISORDER (ADHD), COMBINED TYPE: ICD-10-CM

## 2023-06-20 RX ORDER — DEXTROAMPHETAMINE SACCHARATE, AMPHETAMINE ASPARTATE MONOHYDRATE, DEXTROAMPHETAMINE SULFATE AND AMPHETAMINE SULFATE 6.25; 6.25; 6.25; 6.25 MG/1; MG/1; MG/1; MG/1
25 CAPSULE, EXTENDED RELEASE ORAL DAILY
Qty: 30 CAPSULE | Refills: 0 | Status: SHIPPED | OUTPATIENT
Start: 2023-06-20 | End: 2023-07-06 | Stop reason: SDUPTHER

## 2023-07-06 ENCOUNTER — BEHAVIORAL HEALTH (OUTPATIENT)
Dept: BEHAVIORAL HEALTH | Age: 48
End: 2023-07-06

## 2023-07-06 DIAGNOSIS — F51.01 PRIMARY INSOMNIA: Primary | ICD-10-CM

## 2023-07-06 DIAGNOSIS — F90.2 ATTENTION DEFICIT HYPERACTIVITY DISORDER (ADHD), COMBINED TYPE: ICD-10-CM

## 2023-07-06 PROCEDURE — 99214 OFFICE O/P EST MOD 30 MIN: CPT | Performed by: PSYCHIATRY & NEUROLOGY

## 2023-07-06 RX ORDER — DEXTROAMPHETAMINE SACCHARATE, AMPHETAMINE ASPARTATE MONOHYDRATE, DEXTROAMPHETAMINE SULFATE AND AMPHETAMINE SULFATE 6.25; 6.25; 6.25; 6.25 MG/1; MG/1; MG/1; MG/1
25 CAPSULE, EXTENDED RELEASE ORAL DAILY
Qty: 30 CAPSULE | Refills: 0 | Status: SHIPPED | OUTPATIENT
Start: 2023-07-06 | End: 2023-07-27 | Stop reason: SDUPTHER

## 2023-07-06 RX ORDER — HYDROXYZINE HYDROCHLORIDE 25 MG/1
25 TABLET, FILM COATED ORAL NIGHTLY PRN
Qty: 270 TABLET | Refills: 0 | Status: SHIPPED | OUTPATIENT
Start: 2023-07-06 | End: 2023-09-28 | Stop reason: SDUPTHER

## 2023-07-27 DIAGNOSIS — F90.2 ATTENTION DEFICIT HYPERACTIVITY DISORDER (ADHD), COMBINED TYPE: ICD-10-CM

## 2023-07-27 RX ORDER — DEXTROAMPHETAMINE SACCHARATE, AMPHETAMINE ASPARTATE MONOHYDRATE, DEXTROAMPHETAMINE SULFATE AND AMPHETAMINE SULFATE 6.25; 6.25; 6.25; 6.25 MG/1; MG/1; MG/1; MG/1
25 CAPSULE, EXTENDED RELEASE ORAL DAILY
Qty: 30 CAPSULE | Refills: 0 | Status: SHIPPED | OUTPATIENT
Start: 2023-07-27 | End: 2023-09-05 | Stop reason: SDUPTHER

## 2023-08-16 DIAGNOSIS — F51.01 PRIMARY INSOMNIA: ICD-10-CM

## 2023-08-17 RX ORDER — HYDROXYZINE HYDROCHLORIDE 25 MG/1
25 TABLET, FILM COATED ORAL NIGHTLY PRN
Qty: 270 TABLET | Refills: 0 | OUTPATIENT
Start: 2023-08-17

## 2023-09-05 DIAGNOSIS — F90.2 ATTENTION DEFICIT HYPERACTIVITY DISORDER (ADHD), COMBINED TYPE: ICD-10-CM

## 2023-09-05 RX ORDER — DEXTROAMPHETAMINE SACCHARATE, AMPHETAMINE ASPARTATE MONOHYDRATE, DEXTROAMPHETAMINE SULFATE AND AMPHETAMINE SULFATE 6.25; 6.25; 6.25; 6.25 MG/1; MG/1; MG/1; MG/1
25 CAPSULE, EXTENDED RELEASE ORAL DAILY
Qty: 30 CAPSULE | Refills: 0 | Status: SHIPPED | OUTPATIENT
Start: 2023-09-05 | End: 2023-09-28 | Stop reason: SDUPTHER

## 2023-09-28 ENCOUNTER — BEHAVIORAL HEALTH (OUTPATIENT)
Dept: BEHAVIORAL HEALTH | Age: 48
End: 2023-09-28

## 2023-09-28 DIAGNOSIS — F90.2 ATTENTION DEFICIT HYPERACTIVITY DISORDER (ADHD), COMBINED TYPE: ICD-10-CM

## 2023-09-28 DIAGNOSIS — F51.01 PRIMARY INSOMNIA: Primary | ICD-10-CM

## 2023-09-28 PROCEDURE — 99214 OFFICE O/P EST MOD 30 MIN: CPT | Performed by: PSYCHIATRY & NEUROLOGY

## 2023-09-28 RX ORDER — DEXTROAMPHETAMINE SACCHARATE, AMPHETAMINE ASPARTATE MONOHYDRATE, DEXTROAMPHETAMINE SULFATE AND AMPHETAMINE SULFATE 6.25; 6.25; 6.25; 6.25 MG/1; MG/1; MG/1; MG/1
25 CAPSULE, EXTENDED RELEASE ORAL DAILY
Qty: 30 CAPSULE | Refills: 0 | Status: SHIPPED | OUTPATIENT
Start: 2023-09-28 | End: 2023-11-13 | Stop reason: SDUPTHER

## 2023-09-28 RX ORDER — HYDROXYZINE HYDROCHLORIDE 25 MG/1
25 TABLET, FILM COATED ORAL NIGHTLY PRN
Qty: 90 TABLET | Refills: 0 | Status: SHIPPED | OUTPATIENT
Start: 2023-09-28 | End: 2023-11-17 | Stop reason: SDUPTHER

## 2023-11-13 DIAGNOSIS — F90.2 ATTENTION DEFICIT HYPERACTIVITY DISORDER (ADHD), COMBINED TYPE: ICD-10-CM

## 2023-11-13 RX ORDER — DEXTROAMPHETAMINE SACCHARATE, AMPHETAMINE ASPARTATE MONOHYDRATE, DEXTROAMPHETAMINE SULFATE AND AMPHETAMINE SULFATE 6.25; 6.25; 6.25; 6.25 MG/1; MG/1; MG/1; MG/1
25 CAPSULE, EXTENDED RELEASE ORAL DAILY
Qty: 30 CAPSULE | Refills: 0 | Status: SHIPPED | OUTPATIENT
Start: 2023-11-13 | End: 2023-11-17 | Stop reason: SDUPTHER

## 2023-11-17 ENCOUNTER — BEHAVIORAL HEALTH (OUTPATIENT)
Dept: BEHAVIORAL HEALTH | Age: 48
End: 2023-11-17

## 2023-11-17 DIAGNOSIS — F51.01 PRIMARY INSOMNIA: Primary | ICD-10-CM

## 2023-11-17 DIAGNOSIS — F90.2 ATTENTION DEFICIT HYPERACTIVITY DISORDER (ADHD), COMBINED TYPE: ICD-10-CM

## 2023-11-17 PROCEDURE — 99214 OFFICE O/P EST MOD 30 MIN: CPT | Performed by: PSYCHIATRY & NEUROLOGY

## 2023-11-17 RX ORDER — HYDROXYZINE HYDROCHLORIDE 25 MG/1
25 TABLET, FILM COATED ORAL NIGHTLY PRN
Qty: 90 TABLET | Refills: 0 | Status: SHIPPED | OUTPATIENT
Start: 2023-11-17

## 2023-11-17 RX ORDER — DEXTROAMPHETAMINE SACCHARATE, AMPHETAMINE ASPARTATE MONOHYDRATE, DEXTROAMPHETAMINE SULFATE AND AMPHETAMINE SULFATE 6.25; 6.25; 6.25; 6.25 MG/1; MG/1; MG/1; MG/1
25 CAPSULE, EXTENDED RELEASE ORAL DAILY
Qty: 30 CAPSULE | Refills: 0 | Status: SHIPPED | OUTPATIENT
Start: 2023-11-17

## 2023-12-21 DIAGNOSIS — F90.2 ATTENTION DEFICIT HYPERACTIVITY DISORDER (ADHD), COMBINED TYPE: ICD-10-CM

## 2023-12-21 RX ORDER — DEXTROAMPHETAMINE SACCHARATE, AMPHETAMINE ASPARTATE MONOHYDRATE, DEXTROAMPHETAMINE SULFATE AND AMPHETAMINE SULFATE 6.25; 6.25; 6.25; 6.25 MG/1; MG/1; MG/1; MG/1
25 CAPSULE, EXTENDED RELEASE ORAL DAILY
Qty: 30 CAPSULE | Refills: 0 | Status: SHIPPED | OUTPATIENT
Start: 2023-12-21

## 2024-02-02 DIAGNOSIS — F90.2 ATTENTION DEFICIT HYPERACTIVITY DISORDER (ADHD), COMBINED TYPE: ICD-10-CM

## 2024-02-02 RX ORDER — DEXTROAMPHETAMINE SACCHARATE, AMPHETAMINE ASPARTATE MONOHYDRATE, DEXTROAMPHETAMINE SULFATE AND AMPHETAMINE SULFATE 6.25; 6.25; 6.25; 6.25 MG/1; MG/1; MG/1; MG/1
25 CAPSULE, EXTENDED RELEASE ORAL DAILY
Qty: 30 CAPSULE | Refills: 0 | Status: SHIPPED | OUTPATIENT
Start: 2024-02-02

## 2024-02-16 ENCOUNTER — APPOINTMENT (OUTPATIENT)
Dept: BEHAVIORAL HEALTH | Age: 49
End: 2024-02-16

## 2024-02-16 DIAGNOSIS — F51.01 PRIMARY INSOMNIA: Primary | ICD-10-CM

## 2024-02-16 DIAGNOSIS — F90.2 ATTENTION DEFICIT HYPERACTIVITY DISORDER (ADHD), COMBINED TYPE: ICD-10-CM

## 2024-02-16 RX ORDER — DEXTROAMPHETAMINE SACCHARATE, AMPHETAMINE ASPARTATE MONOHYDRATE, DEXTROAMPHETAMINE SULFATE AND AMPHETAMINE SULFATE 6.25; 6.25; 6.25; 6.25 MG/1; MG/1; MG/1; MG/1
25 CAPSULE, EXTENDED RELEASE ORAL DAILY
Qty: 30 CAPSULE | Refills: 0 | Status: SHIPPED | OUTPATIENT
Start: 2024-02-16

## 2024-02-16 RX ORDER — HYDROXYZINE HYDROCHLORIDE 25 MG/1
25 TABLET, FILM COATED ORAL NIGHTLY PRN
Qty: 90 TABLET | Refills: 0 | Status: SHIPPED | OUTPATIENT
Start: 2024-02-16

## 2024-03-08 DIAGNOSIS — F90.2 ATTENTION DEFICIT HYPERACTIVITY DISORDER (ADHD), COMBINED TYPE: ICD-10-CM

## 2024-03-11 RX ORDER — DEXTROAMPHETAMINE SACCHARATE, AMPHETAMINE ASPARTATE MONOHYDRATE, DEXTROAMPHETAMINE SULFATE AND AMPHETAMINE SULFATE 6.25; 6.25; 6.25; 6.25 MG/1; MG/1; MG/1; MG/1
25 CAPSULE, EXTENDED RELEASE ORAL DAILY
Qty: 30 CAPSULE | Refills: 0 | Status: SHIPPED | OUTPATIENT
Start: 2024-03-11

## 2024-03-13 ENCOUNTER — HOSPITAL ENCOUNTER (OUTPATIENT)
Facility: HOSPITAL | Age: 49
Setting detail: OBSERVATION
Discharge: HOME | End: 2024-03-14
Attending: EMERGENCY MEDICINE | Admitting: HOSPITALIST
Payer: COMMERCIAL

## 2024-03-13 ENCOUNTER — APPOINTMENT (OUTPATIENT)
Dept: GENERAL RADIOLOGY | Facility: HOSPITAL | Age: 49
End: 2024-03-13
Attending: EMERGENCY MEDICINE
Payer: COMMERCIAL

## 2024-03-13 ENCOUNTER — APPOINTMENT (OUTPATIENT)
Dept: CV DIAGNOSTICS | Facility: HOSPITAL | Age: 49
End: 2024-03-13
Attending: INTERNAL MEDICINE
Payer: COMMERCIAL

## 2024-03-13 ENCOUNTER — HOSPITAL ENCOUNTER (OUTPATIENT)
Age: 49
Discharge: ANOTHER HOSPITAL | End: 2024-03-13
Payer: COMMERCIAL

## 2024-03-13 VITALS
RESPIRATION RATE: 20 BRPM | SYSTOLIC BLOOD PRESSURE: 166 MMHG | DIASTOLIC BLOOD PRESSURE: 100 MMHG | OXYGEN SATURATION: 99 % | TEMPERATURE: 97 F | HEART RATE: 65 BPM

## 2024-03-13 DIAGNOSIS — R07.9 CHEST PAIN OF UNCERTAIN ETIOLOGY: Primary | ICD-10-CM

## 2024-03-13 DIAGNOSIS — R94.31 ABNORMAL EKG: ICD-10-CM

## 2024-03-13 DIAGNOSIS — R07.9 ACUTE CHEST PAIN: Primary | ICD-10-CM

## 2024-03-13 DIAGNOSIS — I10 ACCELERATED HYPERTENSION: ICD-10-CM

## 2024-03-13 LAB
ANION GAP SERPL CALC-SCNC: 6 MMOL/L (ref 0–18)
ATRIAL RATE: 67 BPM
BASOPHILS # BLD AUTO: 0.07 X10(3) UL (ref 0–0.2)
BASOPHILS NFR BLD AUTO: 0.9 %
BUN BLD-MCNC: 12 MG/DL (ref 9–23)
BUN/CREAT SERPL: 12 (ref 10–20)
CALCIUM BLD-MCNC: 9.3 MG/DL (ref 8.7–10.4)
CHLORIDE SERPL-SCNC: 106 MMOL/L (ref 98–112)
CHOLEST SERPL-MCNC: 202 MG/DL (ref ?–200)
CO2 SERPL-SCNC: 27 MMOL/L (ref 21–32)
CREAT BLD-MCNC: 1 MG/DL
DEPRECATED RDW RBC AUTO: 35.8 FL (ref 35.1–46.3)
EGFRCR SERPLBLD CKD-EPI 2021: 93 ML/MIN/1.73M2 (ref 60–?)
EOSINOPHIL # BLD AUTO: 0.17 X10(3) UL (ref 0–0.7)
EOSINOPHIL NFR BLD AUTO: 2.2 %
ERYTHROCYTE [DISTWIDTH] IN BLOOD BY AUTOMATED COUNT: 11.9 % (ref 11–15)
EST. AVERAGE GLUCOSE BLD GHB EST-MCNC: 217 MG/DL (ref 68–126)
GLUCOSE BLD-MCNC: 181 MG/DL (ref 70–99)
HBA1C MFR BLD: 9.2 % (ref ?–5.7)
HCT VFR BLD AUTO: 43.7 %
HDLC SERPL-MCNC: 39 MG/DL (ref 40–59)
HGB BLD-MCNC: 15.2 G/DL
IMM GRANULOCYTES # BLD AUTO: 0.02 X10(3) UL (ref 0–1)
IMM GRANULOCYTES NFR BLD: 0.3 %
LDLC SERPL CALC-MCNC: 130 MG/DL (ref ?–100)
LYMPHOCYTES # BLD AUTO: 3.32 X10(3) UL (ref 1–4)
LYMPHOCYTES NFR BLD AUTO: 42.9 %
MCH RBC QN AUTO: 28.7 PG (ref 26–34)
MCHC RBC AUTO-ENTMCNC: 34.8 G/DL (ref 31–37)
MCV RBC AUTO: 82.5 FL
MONOCYTES # BLD AUTO: 0.71 X10(3) UL (ref 0.1–1)
MONOCYTES NFR BLD AUTO: 9.2 %
NEUTROPHILS # BLD AUTO: 3.45 X10 (3) UL (ref 1.5–7.7)
NEUTROPHILS # BLD AUTO: 3.45 X10(3) UL (ref 1.5–7.7)
NEUTROPHILS NFR BLD AUTO: 44.5 %
NONHDLC SERPL-MCNC: 163 MG/DL (ref ?–130)
OSMOLALITY SERPL CALC.SUM OF ELEC: 292 MOSM/KG (ref 275–295)
P AXIS: 72 DEGREES
P-R INTERVAL: 180 MS
PLATELET # BLD AUTO: 296 10(3)UL (ref 150–450)
POTASSIUM SERPL-SCNC: 3.6 MMOL/L (ref 3.5–5.1)
Q-T INTERVAL: 406 MS
QRS DURATION: 98 MS
QTC CALCULATION (BEZET): 429 MS
R AXIS: 51 DEGREES
RBC # BLD AUTO: 5.3 X10(6)UL
SODIUM SERPL-SCNC: 139 MMOL/L (ref 136–145)
T AXIS: 136 DEGREES
TRIGL SERPL-MCNC: 187 MG/DL (ref 30–149)
TROPONIN I SERPL HS-MCNC: 13 NG/L
TROPONIN I SERPL HS-MCNC: 15 NG/L
TROPONIN I SERPL HS-MCNC: 15 NG/L
VENTRICULAR RATE: 67 BPM
VLDLC SERPL CALC-MCNC: 34 MG/DL (ref 0–30)
WBC # BLD AUTO: 7.7 X10(3) UL (ref 4–11)

## 2024-03-13 PROCEDURE — 84484 ASSAY OF TROPONIN QUANT: CPT | Performed by: HOSPITALIST

## 2024-03-13 PROCEDURE — 99214 OFFICE O/P EST MOD 30 MIN: CPT

## 2024-03-13 PROCEDURE — 93010 ELECTROCARDIOGRAM REPORT: CPT

## 2024-03-13 PROCEDURE — 80061 LIPID PANEL: CPT | Performed by: HOSPITALIST

## 2024-03-13 PROCEDURE — 99285 EMERGENCY DEPT VISIT HI MDM: CPT

## 2024-03-13 PROCEDURE — 84484 ASSAY OF TROPONIN QUANT: CPT | Performed by: EMERGENCY MEDICINE

## 2024-03-13 PROCEDURE — 85025 COMPLETE CBC W/AUTO DIFF WBC: CPT | Performed by: EMERGENCY MEDICINE

## 2024-03-13 PROCEDURE — 93005 ELECTROCARDIOGRAM TRACING: CPT

## 2024-03-13 PROCEDURE — 83036 HEMOGLOBIN GLYCOSYLATED A1C: CPT | Performed by: HOSPITALIST

## 2024-03-13 PROCEDURE — 93306 TTE W/DOPPLER COMPLETE: CPT | Performed by: INTERNAL MEDICINE

## 2024-03-13 PROCEDURE — 36415 COLL VENOUS BLD VENIPUNCTURE: CPT

## 2024-03-13 PROCEDURE — 80048 BASIC METABOLIC PNL TOTAL CA: CPT | Performed by: EMERGENCY MEDICINE

## 2024-03-13 PROCEDURE — 71045 X-RAY EXAM CHEST 1 VIEW: CPT | Performed by: EMERGENCY MEDICINE

## 2024-03-13 RX ORDER — ONDANSETRON 2 MG/ML
4 INJECTION INTRAMUSCULAR; INTRAVENOUS EVERY 6 HOURS PRN
Status: DISCONTINUED | OUTPATIENT
Start: 2024-03-13 | End: 2024-03-14

## 2024-03-13 RX ORDER — HYDRALAZINE HYDROCHLORIDE 25 MG/1
25 TABLET, FILM COATED ORAL EVERY 8 HOURS PRN
Status: DISCONTINUED | OUTPATIENT
Start: 2024-03-13 | End: 2024-03-14

## 2024-03-13 RX ORDER — MELATONIN
3 NIGHTLY PRN
Status: DISCONTINUED | OUTPATIENT
Start: 2024-03-13 | End: 2024-03-14

## 2024-03-13 RX ORDER — ENOXAPARIN SODIUM 100 MG/ML
40 INJECTION SUBCUTANEOUS DAILY
Status: DISCONTINUED | OUTPATIENT
Start: 2024-03-14 | End: 2024-03-14

## 2024-03-13 RX ORDER — HYDROCHLOROTHIAZIDE 12.5 MG/1
12.5 TABLET ORAL DAILY
Status: DISCONTINUED | OUTPATIENT
Start: 2024-03-14 | End: 2024-03-14

## 2024-03-13 RX ORDER — ASPIRIN 81 MG/1
324 TABLET, CHEWABLE ORAL ONCE
Status: DISCONTINUED | OUTPATIENT
Start: 2024-03-13 | End: 2024-03-14

## 2024-03-13 RX ORDER — IRBESARTAN 300 MG/1
300 TABLET ORAL DAILY
COMMUNITY
Start: 2024-02-13

## 2024-03-13 RX ORDER — METOPROLOL SUCCINATE 100 MG/1
100 TABLET, EXTENDED RELEASE ORAL DAILY
COMMUNITY
Start: 2024-02-28 | End: 2024-03-14

## 2024-03-13 RX ORDER — METOPROLOL SUCCINATE 100 MG/1
100 TABLET, EXTENDED RELEASE ORAL DAILY
Status: DISCONTINUED | OUTPATIENT
Start: 2024-03-14 | End: 2024-03-14

## 2024-03-13 RX ORDER — ACETAMINOPHEN 500 MG
500 TABLET ORAL EVERY 6 HOURS PRN
Status: DISCONTINUED | OUTPATIENT
Start: 2024-03-13 | End: 2024-03-14

## 2024-03-13 RX ORDER — LOSARTAN POTASSIUM 100 MG/1
100 TABLET ORAL DAILY
Status: DISCONTINUED | OUTPATIENT
Start: 2024-03-13 | End: 2024-03-14

## 2024-03-13 RX ORDER — AMLODIPINE BESYLATE 10 MG/1
10 TABLET ORAL DAILY
Status: DISCONTINUED | OUTPATIENT
Start: 2024-03-13 | End: 2024-03-14

## 2024-03-13 NOTE — ED QUICK NOTES
Transport here to take patient to Echo.  Per patient , patient to go his assigned room after echo.

## 2024-03-13 NOTE — ED INITIAL ASSESSMENT (HPI)
Patient arrived by EMS to ED from , A/O x 4, with complaints of chest pain and HTN    Patient states that dull, nagging chest pain with palpitations that started last night. Patient also states BP he's been elevated since yesterday (SBP in the 190's) EMS obtained BP of 197/82, abnormal for patient (normally 160s SBP). IC obtained NSR, Normal EKG. Patient denies SOB. Patient takes 100mg Metoprolol, accidentally took 300mg Irbesartan yesterday instead, took Metoprolol this morning.

## 2024-03-13 NOTE — CONSULTS
Cardiology Consultation  [facility]    Clarence Poole Patient Status:  Emergency    1975 MRN [id number]   Location [facility] EMERGENCY DEPARTMENT Attending Vasquez Baker MD   Hosp Day # 0 PCP None Pcp     Reason for Consultation:  Chest pain    History of Present Illness:  Clarence Pooel is a a(n) 48 year old male with hypertension, hyperlipidemia, sleep apnea who presents to the emergency room with chest pain for the past 12 hours.    Patient states that at home he is on 100 mg of metoprolol daily.  He noticed that his blood pressure was spiking to the 190 mmHg last night.  He had also been having dull chest pain with palpitations overnight.  He states that he thinks he has had similar symptoms in 2018 when he underwent cardiac workup.  At that time cardiac workup was negative.  Lexiscan was done which was negative.    He is now currently chest pain-free.  He has noted palpitations while he has been in the emergency room, but this is not consistent with any arrhythmia.    Initial high-sensitivity troponin 15.  EKG with normal sinus rhythm, T wave inversions lateral leads.    Assessment/Plan:      Chest pain, may be cardiac in etiology or related to hypertensive crisis.  Initial high-sensitivity troponin is normal.  HTN, significantly elevated  HLD  CHELA    Plan  Trend troponin  Home irbesartan  Norvasc 10mg   IV hydralazine prn    History:  Past Medical History:   Diagnosis Date    ADHD     Dyslipidemia     Essential hypertension     SLEEP APNEA     hx sleep apnea - but lost wt, still snores but no more apnea     Past Surgical History:   Procedure Laterality Date    OTHER SURGICAL HISTORY      uvula abscess drained     Family History   Problem Relation Age of Onset    Hypertension Father     Hypertension Mother     Cancer Maternal Grandmother         breast    Diabetes Maternal Grandmother     Heart Disorder Maternal Grandmother         CAD/MI - late in life    Other (Other)  Maternal Grandmother     Diabetes Maternal Grandfather     Diabetes Paternal Grandmother     Diabetes Paternal Grandfather     Heart Disorder Other     Lipids Neg       reports that he has never smoked. He has never used smokeless tobacco. He reports current alcohol use. He reports that he does not use drugs.    Allergies:  Allergies   Allergen Reactions    Penicillins RASH       Medications:  No current facility-administered medications on file prior to encounter.     Current Outpatient Medications on File Prior to Encounter   Medication Sig Dispense Refill    metoprolol succinate  MG Oral Tablet 24 Hr Take 1 tablet (100 mg total) by mouth daily.      Irbesartan 300 MG Oral Tab Take 1 tablet (300 mg total) by mouth daily.      Amphetamine-Dextroamphet ER 25 MG Oral Capsule SR 24 Hr          Review of Systems:  Constitutional: denies fevers, chills, night sweats  HEENT: denies headache, vision changes, trouble or pain with swallowing  Cardiac: denies chest pain, palpitations, edema  Pulm: denies dyspnea, cough, wheeze  GI: denies n/v, abd pain, diarrhea or constipation  : denies hematuria, dysuria, incontinence  MSK: denies muscle or joint pains  Neuro: denies numbness, weakness, paresthesias  Psych: denies anxiety, depression  Integument: denies skin rashes or lesions  Heme: denies easy bruising or bleeding  Endo: denies heat/cold intolerance, skin or nail changes      Physical Exam:  Blood pressure (!) 156/98, pulse 59, temperature 97.9 °F (36.6 °C), temperature source Temporal, resp. rate 16, height 6' 3\" (1.905 m), weight 220 lb (99.8 kg), SpO2 96%.  Wt Readings from Last 3 Encounters:   03/13/24 220 lb (99.8 kg)   03/17/20 220 lb (99.8 kg)   04/09/18 234 lb 3.2 oz (106.2 kg)       General: awake, alert, oriented x 3, no acute distress  HEENT: at/nc, perrl, eomi  Neck: No JVD, carotids 2+ no bruits.  Cardiac: Regular rate and rhythm, S1, S2 normal, no murmur, rub or gallop.  Lungs: Clear without wheezes,  rales, rhonchi or dullness.  Normal excursions and effort.  Abdomen: Soft, non-tender, non-distended, normal bowel sounds   Extremities: Without clubbing, cyanosis or edema.  Peripheral pulses are 2+.  Neurologic: Alert and oriented, normal affect.  Psych: normal mood and affect  Skin: Warm and dry.       Laboratories and Data:  Diagnostics:      Labs:   CBC:    Lab Results   Component Value Date    WBC 7.7 03/13/2024    WBC 9.4 03/17/2020    WBC 7.5 01/06/2018     Lab Results   Component Value Date    HEMOGLOBIN 14.3 08/17/2010    HGB 15.2 03/13/2024    HGB 15.2 03/17/2020    HGB 14.5 01/06/2018      Lab Results   Component Value Date    .0 03/13/2024    .0 03/17/2020     01/06/2018     BMP:     Lab Results   Component Value Date    GLUCOSE 95 08/17/2010     Lab Results   Component Value Date    K 3.6 03/13/2024    K 3.4 (L) 03/17/2020    K 3.4 01/06/2018     Lab Results   Component Value Date    BUN 12 03/13/2024    BUN 9 03/17/2020    BUN 11 01/06/2018     Lab Results   Component Value Date    CREATSERUM 1.00 03/13/2024    CREATSERUM 1.03 03/17/2020    CREATSERUM 0.89 01/06/2018     Cholesterol:     Lab Results   Component Value Date    CHOLEST 206 (H) 02/14/2018    CHOLEST 190 01/06/2018    CHOLEST 197 05/28/2017     Lab Results   Component Value Date    HDL 36 02/14/2018    HDL 36 01/06/2018    HDL 31 05/28/2017     Lab Results   Component Value Date    TRIG 165 (H) 02/14/2018    TRIG 291 (H) 01/06/2018    TRIG 216 (H) 05/28/2017    TRIGLY 155 (H) 08/17/2010     Lab Results   Component Value Date     (H) 02/14/2018    LDL 96 01/06/2018     (H) 05/28/2017     Lab Results   Component Value Date    AST 21 01/06/2018    AST 20 08/17/2010     Lab Results   Component Value Date    ALT 22 01/06/2018    ALT 24 08/17/2010           Josh Drummond MD  Interventional Cardiology  [place]  3/13/2024  5:08 PM

## 2024-03-13 NOTE — H&P
[facility] Hospitalist H&P       CC:   Chief Complaint   Patient presents with    Chest Pain        PCP: None Pcp    History of Present Illness: Mr. Poole is a 48 year old male with PMH sig for HTN, ADHD, CHELA (not able to tolerate CPAP), who presents with chest pain, elevated BP.  Patient states that yesterday he began having palpitations and chest pain prior to going to bed, pain lasted a few minutes, non radiating, no dyspnea, or diaphoresis, did return on and off, not associated with exertion.  He also notes his BP was in the 190s, last night and today.  He went to immediate care and was sent here.  Denies nausea, vomiting, no fevers or chills, no HA or vision changes, no other  complaints.          PMH  Past Medical History:   Diagnosis Date    ADHD     Dyslipidemia     Essential hypertension     SLEEP APNEA     hx sleep apnea - but lost wt, still snores but no more apnea        PSH  Past Surgical History:   Procedure Laterality Date    OTHER SURGICAL HISTORY  1994    uvula abscess drained        ALL:  Allergies   Allergen Reactions    Penicillins RASH        Home Medications:  No outpatient medications have been marked as taking for the 3/13/24 encounter (Hospital Encounter).         Soc Hx  Social History     Tobacco Use    Smoking status: Never    Smokeless tobacco: Never   Substance Use Topics    Alcohol use: Yes     Comment: 1 per mth        Fam Hx  Family History   Problem Relation Age of Onset    Hypertension Father     Hypertension Mother     Cancer Maternal Grandmother         breast    Diabetes Maternal Grandmother     Heart Disorder Maternal Grandmother         CAD/MI - late in life    Other (Other) Maternal Grandmother     Diabetes Maternal Grandfather     Diabetes Paternal Grandmother     Diabetes Paternal Grandfather     Heart Disorder Other     Lipids Neg        Review of Systems  Comprehensive ROS reviewed and negative except for what's stated above.     OBJECTIVE:  BP (!) 147/101    Pulse 56   Temp 97.9 °F (36.6 °C) (Temporal)   Resp 16   Ht 6' 3\" (1.905 m)   Wt 220 lb (99.8 kg)   SpO2 99%   BMI 27.50 kg/m²   General:  Alert, no distress   Head:  Normocephalic, without obvious abnormality, atraumatic.   Eyes:  Sclera anicteric, No conjunctival pallor,    Nose: Nares normal. Septum midline. Mucosa normal. No drainage.   Throat: Lips, mucosa, and tongue normal. Teeth and gums normal.   Neck: Supple,    Lungs:   Clear to auscultation bilaterally. Normal effort   Chest wall:  No tenderness or deformity.   Heart:  Regular rate and rhythm, S1, S2 normal, no murmur, rub or gallop appreciated   Abdomen:   Soft, non-tender. Bowel sounds normal. No masses,  No organomegaly. Non distended   Extremities: Extremities normal, atraumatic, no cyanosis or edema.   Skin: Skin color, texture, turgor normal. No rashes or lesions.    Neurologic: Normal strength, no focal deficit appreciated     Diagnostic Data:    CBC/Chem  Recent Labs   Lab 03/13/24  1521   WBC 7.7   HGB 15.2   MCV 82.5   .0       Recent Labs   Lab 03/13/24  1521      K 3.6      CO2 27.0   BUN 12   CREATSERUM 1.00   *   CA 9.3       No results for input(s): \"ALT\", \"AST\", \"ALB\", \"AMYLASE\", \"LIPASE\", \"LDH\" in the last 168 hours.    Invalid input(s): \"ALPHOS\", \"TBIL\", \"DBIL\", \"TPROT\"    No results for input(s): \"TROP\" in the last 168 hours.       Radiology: XR CHEST AP PORTABLE  (CPT=71045)    Result Date: 3/13/2024  CONCLUSION:   Negative for radiographically evident acute intrathoracic process.   Dictated by (CST): Blane Ramirez MD on 3/13/2024 at 4:35 PM     Finalized by (CST): Blane Ramirez MD on 3/13/2024 at 4:36 PM             ASSESSMENT / PLAN:   Mr. Poole is a 48 year old male with PMH sig for HTN, ADHD, CHELA (not able to tolerate CPAP), who presents with chest pain, elevated BP.      Chest pain / trops  - trop negative, ECG with reviewed  - trend trops  - Cardiology consulted  - echo ordered  - tele    HTN  uncontrolled  - resume ARB  - resume Metoprolol  - add amlodipine  - add low dose hydrochlorothiazide in am    HLD  - not on statin, check lipids    Hyperglycemia  - check A1c    CHELA  - cannot tolerate CPAP  - discussed outpatient fu for secondary device, he is already planning this    ADHD  - hold adderall    FN:  - IVF:none  - Diet: adv     DVT Prophy: scd, lovenox  Lines: PIV    Dispo: pending clinical course    Outpatient records or previous hospital records reviewed.     Further recommendations pending patient's clinical course.  [facility] hospitalist to continue to follow patient while in house    Patient and/or patient's family given opportunity to ask questions and note understanding and agreeing with therapeutic plan as outlined    Thank You,  Gerardo Howe MD    Hospitalist with [facility]  Answering Service number: [phone]

## 2024-03-13 NOTE — ED PROVIDER NOTES
Patient Seen in: [facility] Emergency Department      History     Chief Complaint   Patient presents with    Chest Pain     Stated Complaint: chest pain    Subjective:   HPI    48-year-old male with a doctor elsewhere with hypertension recently switching from irbesartan to metoprolol who has had some palpitations and anterior nonradiating chest pain since last night.  Symptoms did not necessarily seem exertional in nature.  No established cardiac history.  Remote stress testing.   no recent travel.  Sent from immediate care for further evaluation.    Objective:   Past Medical History:   Diagnosis Date    ADHD     Dyslipidemia     Essential hypertension     SLEEP APNEA     hx sleep apnea - but lost wt, still snores but no more apnea              Past Surgical History:   Procedure Laterality Date    OTHER SURGICAL HISTORY  1994    uvula abscess drained                Social History     Socioeconomic History    Marital status: Single   Tobacco Use    Smoking status: Never    Smokeless tobacco: Never   Vaping Use    Vaping Use: Never used   Substance and Sexual Activity    Alcohol use: Yes     Comment: 1 per mth    Drug use: No              Review of Systems    Positive for stated complaint: chest pain  Other systems are as noted in HPI.  Constitutional and vital signs reviewed.      All other systems reviewed and negative except as noted above.    Physical Exam     ED Triage Vitals [03/13/24 1204]   BP (!) 179/108   Pulse 65   Resp 17   Temp 97.9 °F (36.6 °C)   Temp src Temporal   SpO2 97 %   O2 Device None (Room air)       Current:BP (!) 156/98   Pulse 59   Temp 97.9 °F (36.6 °C) (Temporal)   Resp 16   Ht 190.5 cm (6' 3\")   Wt 99.8 kg   SpO2 96%   BMI 27.50 kg/m²         Physical Exam    Constitutional: Oriented to person, place, and time.  Appears well-developed. No distress.   Head: Normocephalic and atraumatic.   Eyes: Conjunctivae are normal. Pupils are equal, round, and reactive to light.   Neck:  Normal range of motion. Neck supple.   Cardiovascular: Normal rate, regular rhythm and intact and equal distal pulses.    Pulmonary/Chest: Effort normal. No respiratory distress.  No audible wheeze or crackles  Abdominal: Soft. There is no tenderness. There is no guarding.   Musculoskeletal: Normal range of motion. No edema or tenderness.   Neurological: Alert and oriented to person, place, and time.  No gross focal deficits  Skin: Skin is warm and dry.   Psychiatric: Normal mood and affect.  Behavior is normal.   Nursing note and vitals reviewed.    Differential diagnosis includes accelerated hypertension, LVH, ACS and non-STEMI.      ED Course     Labs Reviewed   BASIC METABOLIC PANEL (8) - Abnormal; Notable for the following components:       Result Value    Glucose 181 (*)     All other components within normal limits   TROPONIN I HIGH SENSITIVITY - Normal   CBC WITH DIFFERENTIAL WITH PLATELET    Narrative:     The following orders were created for panel order CBC With Differential With Platelet.  Procedure                               Abnormality         Status                     ---------                               -----------         ------                     CBC W/ DIFFERENTIAL[[id number]]                              Final result                 Please view results for these tests on the individual orders.   CBC W/ DIFFERENTIAL     EKG    Rate, intervals and axes as noted on EKG Report.  Rate: 67  Rhythm: Sinus Rhythm  Reading: anterolateral ST and T wave changes, high lateral depression       Repeat EKG at 1530 shows sinus rate of 58 with similar changes as noted above but no acute ST elevation                 XR CHEST AP PORTABLE  (CPT=71045)    Result Date: 3/13/2024  PROCEDURE: XR CHEST AP PORTABLE  (CPT=71045) TIME: 1545.   COMPARISON: [facility], XR CHEST AP PORTABLE (CPT=71010), 5/27/2017, 7:00 PM.  INDICATIONS: Left sided chest pain.  TECHNIQUE:   Single view.   FINDINGS:   COMMENT: Overlying wires and treatment artifacts obscure fine detail. CARDIAC/VASC: No cardiac silhouette abnormality or cardiomegaly.  Unremarkable pulmonary vasculature.  MEDIAST/GUSTAVO:   Stable prominence of the right paratracheal stripe, which likely relates to tortuous mediastinal vasculature. LUNGS/PLEURA: No significant pulmonary parenchymal abnormalities.  No effusion or pleural thickening. BONES: No fracture or visible bony lesion. OTHER: Negative.          CONCLUSION:   Negative for radiographically evident acute intrathoracic process.   Dictated by (CST): Blane Ramirez MD on 3/13/2024 at 4:35 PM     Finalized by (CST): Blane Ramirez MD on 3/13/2024 at 4:36 PM                  [facility]        Admission disposition: 3/13/2024  4:52 PM                                        Medical Decision Making  No chest pain here.  Blood pressure downtrending.  EKG with possible LVH changes, less likely ischemic given the initial troponin is normal.  I did message the hospitalist.  The patient was actually seen by the cardiologist Dr. Drummond here.  She recommended an echo and blood pressure control.  The patient will be admitted under observation on telemetry tonight for further monitoring and management.  He is agreeable.  He was given aspirin previously en route by EMS.    Problems Addressed:  Abnormal EKG: undiagnosed new problem with uncertain prognosis  Accelerated hypertension: chronic illness or injury with exacerbation, progression, or side effects of treatment that poses a threat to life or bodily functions  Chest pain of uncertain etiology: acute illness or injury    Amount and/or Complexity of Data Reviewed  External Data Reviewed: ECG.     Details: May 2017 EKG reviewed, this EKG was normal  compared to today's abnormal EKG  Labs: ordered. Decision-making details documented in ED Course.  Radiology: ordered and independent interpretation performed. Decision-making details documented in ED Course.     Details:  By my review there is no obvious evidence of pulmonary edema, pleural effusion, pneumothorax or focal infiltrate on x-ray imaging.  ECG/medicine tests: ordered and independent interpretation performed. Decision-making details documented in ED Course.    Risk  Decision regarding hospitalization.        Disposition and Plan     Clinical Impression:  1. Chest pain of uncertain etiology    2. Accelerated hypertension    3. Abnormal EKG         Disposition:  Admit  3/13/2024  4:52 pm    Follow-up:  No follow-up provider specified.        Medications Prescribed:  Current Discharge Medication List                            Hospital Problems       Present on Admission  Date Reviewed: 12/26/2021            ICD-10-CM Noted POA    * (Principal) Chest pain of uncertain etiology R07.9 3/13/2024 Unknown

## 2024-03-13 NOTE — ED PROVIDER NOTES
Patient Seen in: [facility]      History     Chief Complaint   Patient presents with    Chest Pain    Hypertension     Stated Complaint: Elevated BP; Heart Palpitations    Subjective:   HPI    This is a well-appearing 48-year-old male with a history of hypertension and hyperlipidemia who presents with a chief complaint of chest pain.  Patient states yesterday around 2 PM he started feeling lightheaded in addition to having left-sided chest pain.  Reports it is a dull pain to the left side of his chest.  States he did take his blood pressure yesterday and it was elevated at 193/125.  Patient states that overnight symptoms seem to be a little bit worse as pain intensified and he did have some palpitations.  States today pain has just been constant and dull.  Nonradiating.  He appear short of breath but states that that is his baseline.    Objective:   Past Medical History:   Diagnosis Date    ADHD     Dyslipidemia     Essential hypertension     SLEEP APNEA     hx sleep apnea - but lost wt, still snores but no more apnea              Past Surgical History:   Procedure Laterality Date    OTHER SURGICAL HISTORY  1994    uvula abscess drained                No pertinent social history.            Review of Systems   Cardiovascular:  Positive for chest pain.   Neurological:  Positive for light-headedness.   All other systems reviewed and are negative.      Positive for stated complaint: Elevated BP; Heart Palpitations  Other systems are as noted in HPI.  Constitutional and vital signs reviewed.      All other systems reviewed and negative except as noted above.    Physical Exam     ED Triage Vitals [03/13/24 1120]   BP (!) 166/100   Pulse 65   Resp 20   Temp 97.4 °F (36.3 °C)   Temp src Temporal   SpO2 99 %   O2 Device None (Room air)       Current:BP (!) 166/100   Pulse 65   Temp 97.4 °F (36.3 °C) (Temporal)   Resp 20   SpO2 99%         Physical Exam  Vitals and nursing note reviewed.   Constitutional:        General: He is awake. He is not in acute distress.     Appearance: Normal appearance. He is not ill-appearing, toxic-appearing or diaphoretic.   HENT:      Head: Normocephalic and atraumatic.      Right Ear: Tympanic membrane, ear canal and external ear normal.      Left Ear: Tympanic membrane, ear canal and external ear normal.      Nose: Nose normal.      Mouth/Throat:      Mouth: Mucous membranes are moist.      Pharynx: Oropharynx is clear. Uvula midline.   Eyes:      General: Lids are normal.      Extraocular Movements: Extraocular movements intact.      Conjunctiva/sclera: Conjunctivae normal.      Pupils: Pupils are equal, round, and reactive to light.   Cardiovascular:      Rate and Rhythm: Normal rate and regular rhythm.      Pulses: Normal pulses.      Heart sounds: Normal heart sounds.   Pulmonary:      Effort: Pulmonary effort is normal.      Breath sounds: Normal breath sounds and air entry.      Comments: Mildly labored respirations  Skin:     General: Skin is warm and dry.      Capillary Refill: Capillary refill takes less than 2 seconds.   Neurological:      General: No focal deficit present.      Mental Status: He is alert and oriented to person, place, and time.   Psychiatric:         Mood and Affect: Mood normal.         Behavior: Behavior normal. Behavior is cooperative.         Thought Content: Thought content normal.         Judgment: Judgment normal.       ED Course   Labs Reviewed - No data to display  EKG    Rate, intervals and axes as noted on EKG Report.  Rate: 67  Rhythm: NSR  Reading: Normal sinus rhythm.  Possible left atrial enlargement.  ST and T wave abnormality.  Consider lateral ischemia.      MDM       Medical Decision Making  Patient hypertensive, EKG changes since previous EKG in 2017.  Discussed with the patient limitations of IC.  Discussed this case with Dr. Sauceda who does agree with the need for higher level of care.  Patient did take a Lyft here so we will call EMS  to take patient to [facility].    Problems Addressed:  Acute chest pain: acute illness or injury    Amount and/or Complexity of Data Reviewed  ECG/medicine tests: ordered and independent interpretation performed. Decision-making details documented in ED Course.        Disposition and Plan     Clinical Impression:  1. Acute chest pain         Disposition:  Ic to ed  3/13/2024 11:29 am    Follow-up:  No follow-up provider specified.        Medications Prescribed:  Current Discharge Medication List

## 2024-03-13 NOTE — ED INITIAL ASSESSMENT (HPI)
Patient reports episodes of hypertension, left sided chest pain and lightheadedness starting yesterday.  States he obtained a blood pressure reading of 193/125 yesterday.  States he takes metoprolol at home.  Also reports heart palpitations last night.  Currently denies shortness of breath.

## 2024-03-13 NOTE — ED QUICK NOTES
Orders for admission, patient is aware of plan and ready to go upstairs. Any questions, please call ED RN Roma at extension [phone].     Patient Covid vaccination status: Partially vaccinated     COVID Test Ordered in ED: None    COVID Suspicion at Admission: N/A    Running Infusions:  None    Mental Status/LOC at time of transport: A&Ox4    Other pertinent information:   CIWA score: N/A   NIH score:  N/A

## 2024-03-14 VITALS
WEIGHT: 235.19 LBS | BODY MASS INDEX: 29.24 KG/M2 | HEART RATE: 61 BPM | HEIGHT: 75 IN | SYSTOLIC BLOOD PRESSURE: 132 MMHG | DIASTOLIC BLOOD PRESSURE: 87 MMHG | TEMPERATURE: 98 F | OXYGEN SATURATION: 97 % | RESPIRATION RATE: 18 BRPM

## 2024-03-14 LAB
ANION GAP SERPL CALC-SCNC: 6 MMOL/L (ref 0–18)
ATRIAL RATE: 58 BPM
BUN BLD-MCNC: 10 MG/DL (ref 9–23)
BUN/CREAT SERPL: 10.5 (ref 10–20)
CALCIUM BLD-MCNC: 9 MG/DL (ref 8.7–10.4)
CHLORIDE SERPL-SCNC: 106 MMOL/L (ref 98–112)
CO2 SERPL-SCNC: 26 MMOL/L (ref 21–32)
CREAT BLD-MCNC: 0.95 MG/DL
EGFRCR SERPLBLD CKD-EPI 2021: 99 ML/MIN/1.73M2 (ref 60–?)
GLUCOSE BLD-MCNC: 182 MG/DL (ref 70–99)
GLUCOSE BLDC GLUCOMTR-MCNC: 187 MG/DL (ref 70–99)
GLUCOSE BLDC GLUCOMTR-MCNC: 270 MG/DL (ref 70–99)
MAGNESIUM SERPL-MCNC: 2 MG/DL (ref 1.6–2.6)
OSMOLALITY SERPL CALC.SUM OF ELEC: 290 MOSM/KG (ref 275–295)
P AXIS: 67 DEGREES
P-R INTERVAL: 186 MS
POTASSIUM SERPL-SCNC: 3.9 MMOL/L (ref 3.5–5.1)
Q-T INTERVAL: 416 MS
QRS DURATION: 96 MS
QTC CALCULATION (BEZET): 408 MS
R AXIS: 19 DEGREES
SODIUM SERPL-SCNC: 138 MMOL/L (ref 136–145)
T AXIS: 139 DEGREES
TROPONIN I SERPL HS-MCNC: 11 NG/L
VENTRICULAR RATE: 58 BPM

## 2024-03-14 PROCEDURE — 80048 BASIC METABOLIC PNL TOTAL CA: CPT | Performed by: HOSPITALIST

## 2024-03-14 PROCEDURE — 84484 ASSAY OF TROPONIN QUANT: CPT | Performed by: HOSPITALIST

## 2024-03-14 PROCEDURE — 83735 ASSAY OF MAGNESIUM: CPT | Performed by: HOSPITALIST

## 2024-03-14 PROCEDURE — 82962 GLUCOSE BLOOD TEST: CPT

## 2024-03-14 RX ORDER — HYDRALAZINE HYDROCHLORIDE 25 MG/1
25 TABLET, FILM COATED ORAL EVERY 4 HOURS PRN
Status: DISCONTINUED | OUTPATIENT
Start: 2024-03-14 | End: 2024-03-14

## 2024-03-14 RX ORDER — BLOOD SUGAR DIAGNOSTIC
STRIP MISCELLANEOUS
Qty: 100 STRIP | Refills: 6 | Status: SHIPPED | OUTPATIENT
Start: 2024-03-14

## 2024-03-14 RX ORDER — AMLODIPINE BESYLATE 10 MG/1
10 TABLET ORAL DAILY
Qty: 30 TABLET | Refills: 0 | Status: SHIPPED | OUTPATIENT
Start: 2024-03-15

## 2024-03-14 RX ORDER — ATORVASTATIN CALCIUM 20 MG/1
20 TABLET, FILM COATED ORAL NIGHTLY
Qty: 30 TABLET | Refills: 0 | Status: SHIPPED | OUTPATIENT
Start: 2024-03-14

## 2024-03-14 RX ORDER — HYDRALAZINE HYDROCHLORIDE 25 MG/1
25 TABLET, FILM COATED ORAL EVERY 8 HOURS PRN
Status: DISCONTINUED | OUTPATIENT
Start: 2024-03-14 | End: 2024-03-14

## 2024-03-14 RX ORDER — BLOOD-GLUCOSE METER
EACH MISCELLANEOUS
Qty: 1 KIT | Refills: 0 | Status: SHIPPED | OUTPATIENT
Start: 2024-03-14

## 2024-03-14 RX ORDER — NICOTINE POLACRILEX 4 MG
15 LOZENGE BUCCAL
Status: DISCONTINUED | OUTPATIENT
Start: 2024-03-14 | End: 2024-03-14

## 2024-03-14 RX ORDER — HYDROCHLOROTHIAZIDE 12.5 MG/1
12.5 TABLET ORAL DAILY
Qty: 30 TABLET | Refills: 0 | Status: SHIPPED | OUTPATIENT
Start: 2024-03-15

## 2024-03-14 RX ORDER — CARVEDILOL 12.5 MG/1
12.5 TABLET ORAL 2 TIMES DAILY WITH MEALS
Status: DISCONTINUED | OUTPATIENT
Start: 2024-03-14 | End: 2024-03-14

## 2024-03-14 RX ORDER — LANCETS 33 GAUGE
EACH MISCELLANEOUS
Qty: 100 EACH | Refills: 6 | Status: SHIPPED | OUTPATIENT
Start: 2024-03-14

## 2024-03-14 RX ORDER — CARVEDILOL 25 MG/1
25 TABLET ORAL 2 TIMES DAILY WITH MEALS
Qty: 60 TABLET | Refills: 0 | Status: SHIPPED | OUTPATIENT
Start: 2024-03-14 | End: 2024-04-13

## 2024-03-14 RX ORDER — DEXTROSE MONOHYDRATE 25 G/50ML
50 INJECTION, SOLUTION INTRAVENOUS
Status: DISCONTINUED | OUTPATIENT
Start: 2024-03-14 | End: 2024-03-14

## 2024-03-14 RX ORDER — NICOTINE POLACRILEX 4 MG
30 LOZENGE BUCCAL
Status: DISCONTINUED | OUTPATIENT
Start: 2024-03-14 | End: 2024-03-14

## 2024-03-14 RX ORDER — ATORVASTATIN CALCIUM 20 MG/1
20 TABLET, FILM COATED ORAL NIGHTLY
Status: DISCONTINUED | OUTPATIENT
Start: 2024-03-14 | End: 2024-03-14

## 2024-03-14 NOTE — PLAN OF CARE
Pt admitted to floor. Vital signs and med rec completed with patient. Admission handoff to HERNANDO Stovall. Call light within reach and safety precautions in place.

## 2024-03-14 NOTE — DISCHARGE PLANNING
Patient was provided with discharge instructions, education, and follow up information. Prescriptions were already sent electronically to patient's pharmacy. Patient verbalizes understanding of follow up information, specifically hypertension, diabetes, checking blood pressure and blood sugar at home, medications prescribed, dose, timing, purpose and side effects, preventing stroke. Patient has no questions after reviewing all instructions and will be going home with his wife.     Ruthy VILLAGOMEZ, Discharge Leader [id number]

## 2024-03-14 NOTE — DIABETES ED
[facility]   Diabetes Education Note      Clarence Poole Patient Status Observation   1975  MRN [id number]  Location  [facility] 3W/SW  Attending Gerardo Howe MD  Hospital Days # 0  PCP  None Pcp    Reason for Visit: Newly Diagnosed      HgbA1C (%)   Date Value   2024 9.2 (H)       Discussion:Met with patient in room for new diagnosis of diabetes. Patient indicates his grandmother had diabetes.  Instructed on lifestyle modifications to assist with blood sugar management. He reports he does not consume red meat, he consumes a larger breakfast and due to work schedule he often skips the lunch meal. During the work day he will snack on unsalted nuts. He indicates he drinks regular lemonade and not does consume concentrated sweets on a regular basis. Instructed on basic meal planning guidelines.  Instructed on the benefits of exercise to assist with glucose management. Emphasized the importance of glucose control to prevent and delay complications from diabetes.    Instructed on current A1c value and goal for persons with diabetes. Reinforced the importance of regular follow up care with outpatient medical team.    Instructed on glucose monitoring and blood sugar targets. He is agreeable to monitor his blood sugar if directed at discharge.    Education provided:  Basic Diabetes pathophysiology  Basic Meal planning  How to test blood glucose using a glucometer. Instructed on Contour Next EZ  Blood sugar target ranges  Signs, treatment and prevention of hypoglycemia   Benefits of exercise    Provided him with diabetes education packet and encouraged him to review.    Recommendations:  Outpatient diabetes education.      Kim Street RN    3/14/2024  11:32 AM

## 2024-03-14 NOTE — PROGRESS NOTES
[facility] Hospitalist Progress Note     CC: Hospital Follow up    PCP: None Pcp       Assessment/Plan:     Principal Problem:    Chest pain of uncertain etiology  Active Problems:    Accelerated hypertension    Abnormal EKG    Mr. Poole is a 48 year old male with PMH sig for HTN, ADHD, CHELA (not able to tolerate CPAP), who presents with chest pain, elevated BP, troponins negative, no evidence of MI, blood pressure slowly improved with oral medications, also found to be newly diagnosed diabetic, as well as found to have hyperlipidemia, cardiology titrating meds, cardiology to recommending outpatient ischemic eval with CTA.     Chest pain / trops  - trop negative x 3, ECG with reviewed  - Cardiology consulted  - echo with mildly reduced EF of 45%, likely dilated LV per cardiology 2/2 HTN, cardiology recommending outpatient ischemic eval with CTA in the next 1 to 2 weeks  -No events on telemetry     HTN uncontrolled  - resume ARB  - Change metoprolol to Coreg  - add amlodipine  - add low dose hydrochlorothiazide       HLD  -Total cholesterol 202, , HDL 39  -Statin started     New diagnosis of type 2 diabetes  Hyperglycemia  -A1c 9.2  -Discussed dietary adjustments, diabetes educator to meet with patient  -Will discharge on oral metformin  -Will send with glucometer and test strips  -Will need PCP follow-up in 1 week and repeat A1c in 9 months     CHELA  - cannot tolerate CPAP  - discussed outpatient fu for secondary device, he is already planning this     ADHD  - hold adderall     FN:  - IVF:none  - Diet: adv      DVT Prophy: scd, lovenox  Lines: PIV     Dispo: pending clinical course       Questions/concerns were discussed with patient and/or family by bedside.    Thank You,  Gerardo Howe MD    Hospitalist with [facility]     Subjective:     No CP, SOB, or N/V.  Feeling better, no headaches or vision changes,    OBJECTIVE:    Blood pressure (!) 168/115, pulse 66, temperature 98.1 °F (36.7 °C),  temperature source Oral, resp. rate 19, height 6' 3\" (1.905 m), weight 235 lb 3.2 oz (106.7 kg), SpO2 97%.    Temp:  [97.8 °F (36.6 °C)-98.2 °F (36.8 °C)] 98.1 °F (36.7 °C)  Pulse:  [51-72] 66  Resp:  [12-19] 19  BP: (134-168)/() 168/115  SpO2:  [95 %-99 %] 97 %      Intake/Output:    Intake/Output Summary (Last 24 hours) at 3/14/2024 1402  Last data filed at 3/14/2024 1000  Gross per 24 hour   Intake 592 ml   Output --   Net 592 ml       Last 3 Weights   03/14/24 0500 235 lb 3.2 oz (106.7 kg)   03/13/24 1856 237 lb (107.5 kg)   03/13/24 1204 220 lb (99.8 kg)   03/17/20 1331 220 lb (99.8 kg)   04/09/18 1720 234 lb 3.2 oz (106.2 kg)       Exam    Gen: No acute distress, alert and oriented x3   Heent: NC AT, neck supple  Pulm: Lungs CTAB  CV: Heart with regular rate and rhythm,   Abd: Abdomen soft, nontender, non-distended   MSK: no clubbing, no cyanosis  Skin: no rashes or lesions  Neuro: AO*3, motor intact, no sensory deficits  Psyc: appropriate mood and affect      Data Review:       Labs:     Recent Labs   Lab 03/13/24  1521   RBC 5.30   HGB 15.2   HCT 43.7   MCV 82.5   MCH 28.7   MCHC 34.8   RDW 11.9   NEPRELIM 3.45   WBC 7.7   .0         Recent Labs   Lab 03/13/24  1521 03/14/24  0713   * 182*   BUN 12 10   CREATSERUM 1.00 0.95   EGFRCR 93 99   CA 9.3 9.0    138   K 3.6 3.9    106   CO2 27.0 26.0       No results for input(s): \"ALT\", \"AST\", \"ALB\", \"AMYLASE\", \"LIPASE\", \"LDH\" in the last 168 hours.    Invalid input(s): \"ALPHOS\", \"TBIL\", \"DBIL\", \"TPROT\"      Imaging:  XR CHEST AP PORTABLE  (CPT=71045)    Result Date: 3/13/2024  CONCLUSION:   Negative for radiographically evident acute intrathoracic process.   Dictated by (CST): Blane Ramirez MD on 3/13/2024 at 4:35 PM     Finalized by (CST): Blane Ramirez MD on 3/13/2024 at 4:36 PM             Meds:      insulin aspart  1-5 Units Subcutaneous TID CC    atorvastatin  20 mg Oral Nightly    carvedilol  12.5 mg Oral BID with meals     aspirin  324 mg Oral Once    amLODIPine  10 mg Oral Daily    losartan  100 mg Oral Daily    enoxaparin  40 mg Subcutaneous Daily    hydrochlorothiazide  12.5 mg Oral Daily       glucose **OR** glucose **OR** glucose-vitamin C **OR** dextrose **OR** glucose **OR** glucose **OR** glucose-vitamin C, hydrALAZINE, acetaminophen, melatonin, ondansetron

## 2024-03-14 NOTE — PROGRESS NOTES
Cardiology Consultation  [facility]    Clarence Poole Patient Status:  Emergency    1975 MRN [id number]   Location [facility] EMERGENCY DEPARTMENT Attending Vasquez Baker MD   Hosp Day # 0 PCP None Pcp     Reason for Consultation:  Chest pain    History of Present Illness:  Clarence Poole is a a(n) 48 year old male with hypertension, hyperlipidemia, sleep apnea who presents to the emergency room with chest pain and hypertension for 12 hrs prior to admission.  In the ED, he was hypertensive and had negative hstrop x 2. EKG was significant for TWI in lateral leads.    Subjective:  Today feels well, no chest pain.    Assessment/Plan:  Chest pain, likely 2/2 HTN, no evidence of ACS  HTN, improved but elevated.  Dilated cardiomyopathy likely 2/2 HTN  HLD  CHELA    Plan  Losartan 100mg  Norvasc 10mg   Switch home metoprolol to coreg 12.5mg BID and uptitrate  IV hydralazine prn  TTE with mildly reduced EF and dilated LV, likely 2/2 HTN. Patient should have ischemic evaluation-- would recommend CTA to be done in outpatient setting.    History:  Past Medical History:   Diagnosis Date    ADHD     Dyslipidemia     Essential hypertension     SLEEP APNEA     hx sleep apnea - but lost wt, still snores but no more apnea     Past Surgical History:   Procedure Laterality Date    OTHER SURGICAL HISTORY      uvula abscess drained     Family History   Problem Relation Age of Onset    Hypertension Father     Hypertension Mother     Cancer Maternal Grandmother         breast    Diabetes Maternal Grandmother     Heart Disorder Maternal Grandmother         CAD/MI - late in life    Other (Other) Maternal Grandmother     Diabetes Maternal Grandfather     Diabetes Paternal Grandmother     Diabetes Paternal Grandfather     Heart Disorder Other     Lipids Neg       reports that he has never smoked. He has never used smokeless tobacco. He reports current alcohol use. He reports that he does not use  drugs.    Allergies:  Allergies   Allergen Reactions    Penicillins RASH       Medications:  No current facility-administered medications on file prior to encounter.     Current Outpatient Medications on File Prior to Encounter   Medication Sig Dispense Refill    metoprolol succinate  MG Oral Tablet 24 Hr Take 1 tablet (100 mg total) by mouth daily.      Irbesartan 300 MG Oral Tab Take 1 tablet (300 mg total) by mouth daily.      Amphetamine-Dextroamphet ER 25 MG Oral Capsule SR 24 Hr          Review of Systems:  Constitutional: denies fevers, chills, night sweats  HEENT: denies headache, vision changes, trouble or pain with swallowing  Cardiac: denies chest pain, palpitations, edema  Pulm: denies dyspnea, cough, wheeze  GI: denies n/v, abd pain, diarrhea or constipation  : denies hematuria, dysuria, incontinence  MSK: denies muscle or joint pains  Neuro: denies numbness, weakness, paresthesias  Psych: denies anxiety, depression  Integument: denies skin rashes or lesions  Heme: denies easy bruising or bleeding  Endo: denies heat/cold intolerance, skin or nail changes      Physical Exam:  Blood pressure 159/86, pulse 72, temperature 98.1 °F (36.7 °C), temperature source Oral, resp. rate 18, height 6' 3\" (1.905 m), weight 235 lb 3.2 oz (106.7 kg), SpO2 95%.  Wt Readings from Last 3 Encounters:   03/14/24 235 lb 3.2 oz (106.7 kg)   03/17/20 220 lb (99.8 kg)   04/09/18 234 lb 3.2 oz (106.2 kg)       General: awake, alert, oriented x 3, no acute distress  HEENT: at/nc, perrl, eomi  Neck: No JVD, carotids 2+ no bruits.  Cardiac: Regular rate and rhythm, S1, S2 normal, no murmur, rub or gallop.  Lungs: Clear without wheezes, rales, rhonchi or dullness.  Normal excursions and effort.  Abdomen: Soft, non-tender, non-distended, normal bowel sounds   Extremities: Without clubbing, cyanosis or edema.  Peripheral pulses are 2+.  Neurologic: Alert and oriented, normal affect.  Psych: normal mood and affect  Skin: Warm and  dry.       Laboratories and Data:  Diagnostics:      Labs:   CBC:    Lab Results   Component Value Date    WBC 7.7 03/13/2024    WBC 9.4 03/17/2020    WBC 7.5 01/06/2018     Lab Results   Component Value Date    HEMOGLOBIN 14.3 08/17/2010    HGB 15.2 03/13/2024    HGB 15.2 03/17/2020    HGB 14.5 01/06/2018      Lab Results   Component Value Date    .0 03/13/2024    .0 03/17/2020     01/06/2018     BMP:     Lab Results   Component Value Date    GLUCOSE 95 08/17/2010     Lab Results   Component Value Date    K 3.9 03/14/2024    K 3.6 03/13/2024    K 3.4 (L) 03/17/2020     Lab Results   Component Value Date    BUN 10 03/14/2024    BUN 12 03/13/2024    BUN 9 03/17/2020     Lab Results   Component Value Date    CREATSERUM 0.95 03/14/2024    CREATSERUM 1.00 03/13/2024    CREATSERUM 1.03 03/17/2020     Cholesterol:     Lab Results   Component Value Date    CHOLEST 202 (H) 03/13/2024    CHOLEST 206 (H) 02/14/2018    CHOLEST 190 01/06/2018     Lab Results   Component Value Date    HDL 39 (L) 03/13/2024    HDL 36 02/14/2018    HDL 36 01/06/2018     Lab Results   Component Value Date    TRIG 187 (H) 03/13/2024    TRIG 165 (H) 02/14/2018    TRIG 291 (H) 01/06/2018    TRIGLY 155 (H) 08/17/2010     Lab Results   Component Value Date     (H) 03/13/2024     (H) 02/14/2018    LDL 96 01/06/2018     Lab Results   Component Value Date    AST 21 01/06/2018    AST 20 08/17/2010     Lab Results   Component Value Date    ALT 22 01/06/2018    ALT 24 08/17/2010           Josh Drummond MD  Interventional Cardiology  [place]

## 2024-03-14 NOTE — DIETARY NOTE
NUTRITION EDUCATION NOTE     Pt screened with elevated A1C of 9.2. Knowledge assessment completed. Declined verbal discussion at this time. Provided with Ready, Set, Start Counting and NCM handout for pt to take home. Receptive. Would benefit from outpt f/u. Expect fair compliance.        Pita Izquierdo RD, LDN  Clinical Dietitian  P: [phone]

## 2024-03-14 NOTE — PLAN OF CARE
Pt A/ox4. Room air. No complaints of CP or dizziness. Ambulated in hallways with no symptoms. Echo results back. Started on blood sugar checks due to elevated AIC. Diabetes educator followed up with patient. Teach back with blood sugar machine performed. BP elevated and prn hydralazine given. Cards ok with slightly elevated BP upon dc. Cleared by cards for dc. DC order in place. Home with diabetes start kit. PO metformin on dc. Home with family. Call light within reach and safety precautions in place.     Problem: Patient Centered Care  Goal: Patient preferences are identified and integrated in the patient's plan of care  Description: Interventions:  - What would you like us to know as we care for you? From home with wife and children  - Provide timely, complete, and accurate information to patient/family  - Incorporate patient and family knowledge, values, beliefs, and cultural backgrounds into the planning and delivery of care  - Encourage patient/family to participate in care and decision-making at the level they choose  - Honor patient and family perspectives and choices  Outcome: Completed     Problem: Patient/Family Goals  Goal: Patient/Family Long Term Goal  Description: Patient's Long Term Goal:     Interventions:  -   - See additional Care Plan goals for specific interventions  Outcome: Completed  Goal: Patient/Family Short Term Goal  Description: Patient's Short Term Goal:     Interventions:   -   - See additional Care Plan goals for specific interventions  Outcome: Completed     Problem: CARDIOVASCULAR - ADULT  Goal: Maintains optimal cardiac output and hemodynamic stability  Description: INTERVENTIONS:  - Monitor vital signs, rhythm, and trends  - Monitor for bleeding, hypotension and signs of decreased cardiac output  - Evaluate effectiveness of vasoactive medications to optimize hemodynamic stability  - Monitor arterial and/or venous puncture sites for bleeding and/or hematoma  - Assess quality of  pulses, skin color and temperature  - Assess for signs of decreased coronary artery perfusion - ex. Angina  - Evaluate fluid balance, assess for edema, trend weights  Outcome: Completed  Goal: Absence of cardiac arrhythmias or at baseline  Description: INTERVENTIONS:  - Continuous cardiac monitoring, monitor vital signs, obtain 12 lead EKG if indicated  - Evaluate effectiveness of antiarrhythmic and heart rate control medications as ordered  - Initiate emergency measures for life threatening arrhythmias  - Monitor electrolytes and administer replacement therapy as ordered  Outcome: Completed     Problem: METABOLIC/FLUID AND ELECTROLYTES - ADULT  Goal: Electrolytes maintained within normal limits  Description: INTERVENTIONS:  - Monitor labs and rhythm and assess patient for signs and symptoms of electrolyte imbalances  - Administer electrolyte replacement as ordered  - Monitor response to electrolyte replacements, including rhythm and repeat lab results as appropriate  - Fluid restriction as ordered  - Instruct patient on fluid and nutrition restrictions as appropriate  Outcome: Completed     Problem: PAIN - ADULT  Goal: Verbalizes/displays adequate comfort level or patient's stated pain goal  Description: INTERVENTIONS:  - Encourage pt to monitor pain and request assistance  - Assess pain using appropriate pain scale  - Administer analgesics based on type and severity of pain and evaluate response  - Implement non-pharmacological measures as appropriate and evaluate response  - Consider cultural and social influences on pain and pain management  - Manage/alleviate anxiety  - Utilize distraction and/or relaxation techniques  - Monitor for opioid side effects  - Notify MD/LIP if interventions unsuccessful or patient reports new pain  - Anticipate increased pain with activity and pre-medicate as appropriate  Outcome: Completed     Problem: SAFETY ADULT - FALL  Goal: Free from fall injury  Description: INTERVENTIONS:  -  Assess pt frequently for physical needs  - Identify cognitive and physical deficits and behaviors that affect risk of falls.  - Darby fall precautions as indicated by assessment.  - Educate pt/family on patient safety including physical limitations  - Instruct pt to call for assistance with activity based on assessment  - Modify environment to reduce risk of injury  - Provide assistive devices as appropriate  - Consider OT/PT consult to assist with strengthening/mobility  - Encourage toileting schedule  Outcome: Completed     Problem: DISCHARGE PLANNING  Goal: Discharge to home or other facility with appropriate resources  Description: INTERVENTIONS:  - Identify barriers to discharge w/pt and caregiver  - Include patient/family/discharge partner in discharge planning  - Arrange for needed discharge resources and transportation as appropriate  - Identify discharge learning needs (meds, wound care, etc)  - Arrange for interpreters to assist at discharge as needed  - Consider post-discharge preferences of patient/family/discharge partner  - Complete POLST form as appropriate  - Assess patient's ability to be responsible for managing their own health  - Refer to Case Management Department for coordinating discharge planning if the patient needs post-hospital services based on physician/LIP order or complex needs related to functional status, cognitive ability or social support system  Outcome: Completed

## 2024-03-14 NOTE — DISCHARGE SUMMARY
General Medicine Discharge Summary     Patient ID:  Clarence Poole  48 year old  4/18/1975    Admit date: 3/13/2024    Discharge date and time: 3/14/2024    Attending Physician: Gerardo Howe MD     Consults: IP CONSULT TO CARDIOLOGY  [facility] CONSULT TO DIABETES EDUCATION    Primary Care Physician: None Pcp     Reason for admission: HTN, chest pain    Risk For Readmission: low    Discharge Diagnoses: Abnormal EKG [R94.31]  Accelerated hypertension [I10]  Chest pain of uncertain etiology [R07.9]  See Additional Discharge Diagnoses in Hospital Course    Discharged Condition: good    Follow-up with labs/images appointments:  Cardiology and PCP in 1 week      Please check BP once daily and document your BP.  Please call your doctor if your blood pressure is consistently > 150.      Please check your blood sugars once daily in the morning.      Please start taking metformin to help control blood sugars, start taking one tablet once daily for 5 days, then twice daily thereafter.      Please follow up with Cardiology in 1 week to adjust meds and get set up for stress test or CTA.     Please arranged for sleep apnea testing/adjustments to current mask asap.            Exam  Gen: No acute distress  Pulm: Lungs clear, normal respiratory effort  CV: Heart with regular rate and rhythm  Abd: Abdomen soft,   EXT: no edema     HPI:   History of Present Illness: Mr. Poole is a 48 year old male with PMH sig for HTN, ADHD, CHELA (not able to tolerate CPAP), who presents with chest pain, elevated BP.  Patient states that yesterday he began having palpitations and chest pain prior to going to bed, pain lasted a few minutes, non radiating, no dyspnea, or diaphoresis, did return on and off, not associated with exertion.  He also notes his BP was in the 190s, last night and today.  He went to immediate care and was sent here.  Denies nausea, vomiting, no fevers or chills, no HA or vision changes, no other  complaints.           Hospital Course:    Mr. Poole is a 48 year old male with PMH sig for HTN, ADHD, CHELA (not able to tolerate CPAP), who presents with chest pain, elevated BP, troponins negative, no evidence of MI, blood pressure slowly improved with oral medications but still elevated will continue to titrate as OP, also found to be newly diagnosed diabetic, as well as found to have hyperlipidemia, cardiology titrated meds, cardiology to recommending outpatient ischemic eval with CTA, cardiology ok for DC home, bp improved, but continued titration of meds and close fu needed, no further chest pain, discussed in detail with patient, agreeable to plan, fu with PCP and cards in 1 week.       Chest pain / palpitations   - trop negative x 3, ECG reviewed  - not hypoxic, not tachycardic, chest pain resolved  - Cardiology followed, recommended home, with outpatient CTA in 1 week  - echo with mildly reduced EF of 45%, likely dilated LV per cardiology 2/2 HTN, cardiology recommending outpatient ischemic eval with CTA in the next 1 to 2 weeks  -No events on telemetry     HTN uncontrolled  - resume ARB  - Change metoprolol to Coreg  - add amlodipine  - add low dose hydrochlorothiazide    - FU with PCP and cardiology in 1 week for BP check.       HLD  -Total cholesterol 202, , HDL 39  -Statin started     New diagnosis of type 2 diabetes  Hyperglycemia  -A1c 9.2  -Discussed dietary adjustments, diabetes educator to meet with patient  -Will discharge on oral metformin  -Will send with glucometer and test strips  -Will need PCP follow-up in 1 week and repeat A1c in 9 months     CHELA  - cannot tolerate CPAP  - discussed outpatient fu for secondary device, he is already planning this     ADHD  - resume adderall, consider weaning as out patient    Operative Procedures:      Imaging: XR CHEST AP PORTABLE  (CPT=71045)    Result Date: 3/13/2024  CONCLUSION:   Negative for radiographically evident acute intrathoracic process.   Dictated by (CST): Blane Ramirez MD  on 3/13/2024 at 4:35 PM     Finalized by (CST): Blane Ramirez MD on 3/13/2024 at 4:36 PM             Disposition: home    Activity: activity as tolerated  Diet: regular diet  Wound Care: as directed  Code Status: Full Code      Home Medication Changes:     Med list     Medication List        START taking these medications      amLODIPine 10 MG Tabs  Commonly known as: Norvasc  Take 1 tablet (10 mg total) by mouth daily.  Start taking on: March 15, 2024     atorvastatin 20 MG Tabs  Commonly known as: Lipitor  Take 1 tablet (20 mg total) by mouth nightly.     carvedilol 25 MG Tabs  Commonly known as: Coreg  Take 1 tablet (25 mg total) by mouth 2 (two) times daily with meals.     hydroCHLOROthiazide 12.5 MG Tabs  Commonly known as: HYDRODIURIL  Take 1 tablet (12.5 mg total) by mouth daily.  Start taking on: March 15, 2024     metFORMIN 500 MG Tabs  Commonly known as: Glucophage  Take 1 tablet (500 mg total) by mouth 2 (two) times daily with meals.     OneTouch Delica Lancets 33G Misc  Use as directed     OneTouch Verio Flex System w/Device Kit  Use as directed.     OneTouch Verio Strp  Use as directed.            CONTINUE taking these medications      Amphetamine-Dextroamphet ER 25 MG Cp24  Commonly known as: ADDERALL XR     Irbesartan 300 MG Tabs            STOP taking these medications      metoprolol succinate  MG Tb24  Commonly known as: Toprol XL               Where to Get Your Medications        These medications were sent to [id number] - [place], [place] - [address] AT [facility], [phone], [phone]  [address], [facility] [address]      Hours: 24-hours Phone: [phone]   amLODIPine 10 MG Tabs  atorvastatin 20 MG Tabs  carvedilol 25 MG Tabs  hydroCHLOROthiazide 12.5 MG Tabs  metFORMIN 500 MG Tabs  OneTouch Delica Lancets 33G Misc  OneTouch Verio Flex System w/Device Kit  OneTouch Verio Strp         FU   Follow-up Information       PCP.  Schedule an appointment as soon as possible for a visit in 1 week(s).    Why: please make appoinement for Follow up with PCP in 5-7 days.             Josh Drummond MD. Schedule an appointment as soon as possible for a visit in 1 week(s).    Specialty: Interventional, Cardiology  Contact information:  [address] [facility]  [address] [place] [id number]  [phone]                             DC instructions:      Other Discharge Instructions:         Please check BP once daily and document your BP.  Please call your doctor if your blood pressure is consistently > 150.      Please check your blood sugars once daily in the morning.      Please start taking metformin to help control blood sugars, start taking one tablet once daily for 5 days, then twice daily thereafter.      Please follow up with Cardiology in 1 week to adjust meds and get set up for stress test or CTA.     Please arranged for sleep apnea testing/adjustments to current mask asap.      You will need repeat blood work to check electrolytes in 1 week, please schedule with PCP (RAEANN) in 1 week.    You will need to have your lipids and A1c tested in 2-3 months.  Please arranged through PCP        I reconciled current and discharge medications on day of discharge, discussed changes with patient and noted changes above.       Total Time Coordinating Care: Greater than 30 minutes    Patient had opportunity to ask questions and state understand and agree with therapeutic plan as outlined    Thank You,    Gerardo Howe MD   Hospitalist with [facility]

## 2024-03-14 NOTE — DISCHARGE INSTRUCTIONS
Please check BP once daily and document your BP.  Please call your doctor if your blood pressure is consistently > 150.    Start taking carvedilol tomorrow 3/15 (normally twice daily but you received today's dose in the hospital)    Please check your blood sugars once daily in the morning.      Please start taking metformin to help control blood sugars, start taking one tablet once daily for 5 days, then twice daily thereafter.      Please follow up with Cardiology in 1 week to adjust meds and get set up for stress test or CTA.     Please arranged for sleep apnea testing/adjustments to current mask asap.      You will need repeat blood work to check electrolytes in 1 week, please schedule with PCP (RAEANN) in 1 week.    You will need to have your lipids and A1c tested in 2-3 months.  Please arranged through PCP

## 2024-03-14 NOTE — RESPIRATORY THERAPY NOTE
Patient has diagnosis of CHELA but does not use CPAP at home. Refused to use CPAP during admission.

## 2024-03-14 NOTE — PLAN OF CARE
No complaints of chest pain overnight. Diastolic BP slightly elevated. Independent in room. Trops(-). Plan: pending 2D echo results    Call light within reach, fall precautions in place  Problem: Patient Centered Care  Goal: Patient preferences are identified and integrated in the patient's plan of care  Description: Interventions:  - What would you like us to know as we care for you? From home with wife and children  - Provide timely, complete, and accurate information to patient/family  - Incorporate patient and family knowledge, values, beliefs, and cultural backgrounds into the planning and delivery of care  - Encourage patient/family to participate in care and decision-making at the level they choose  - Honor patient and family perspectives and choices  Outcome: Progressing     Problem: CARDIOVASCULAR - ADULT  Goal: Maintains optimal cardiac output and hemodynamic stability  Description: INTERVENTIONS:  - Monitor vital signs, rhythm, and trends  - Monitor for bleeding, hypotension and signs of decreased cardiac output  - Evaluate effectiveness of vasoactive medications to optimize hemodynamic stability  - Monitor arterial and/or venous puncture sites for bleeding and/or hematoma  - Assess quality of pulses, skin color and temperature  - Assess for signs of decreased coronary artery perfusion - ex. Angina  - Evaluate fluid balance, assess for edema, trend weights  Outcome: Progressing  Goal: Absence of cardiac arrhythmias or at baseline  Description: INTERVENTIONS:  - Continuous cardiac monitoring, monitor vital signs, obtain 12 lead EKG if indicated  - Evaluate effectiveness of antiarrhythmic and heart rate control medications as ordered  - Initiate emergency measures for life threatening arrhythmias  - Monitor electrolytes and administer replacement therapy as ordered  Outcome: Progressing     Problem: METABOLIC/FLUID AND ELECTROLYTES - ADULT  Goal: Electrolytes maintained within normal limits  Description:  INTERVENTIONS:  - Monitor labs and rhythm and assess patient for signs and symptoms of electrolyte imbalances  - Administer electrolyte replacement as ordered  - Monitor response to electrolyte replacements, including rhythm and repeat lab results as appropriate  - Fluid restriction as ordered  - Instruct patient on fluid and nutrition restrictions as appropriate  Outcome: Progressing     Problem: PAIN - ADULT  Goal: Verbalizes/displays adequate comfort level or patient's stated pain goal  Description: INTERVENTIONS:  - Encourage pt to monitor pain and request assistance  - Assess pain using appropriate pain scale  - Administer analgesics based on type and severity of pain and evaluate response  - Implement non-pharmacological measures as appropriate and evaluate response  - Consider cultural and social influences on pain and pain management  - Manage/alleviate anxiety  - Utilize distraction and/or relaxation techniques  - Monitor for opioid side effects  - Notify MD/LIP if interventions unsuccessful or patient reports new pain  - Anticipate increased pain with activity and pre-medicate as appropriate  Outcome: Progressing     Problem: SAFETY ADULT - FALL  Goal: Free from fall injury  Description: INTERVENTIONS:  - Assess pt frequently for physical needs  - Identify cognitive and physical deficits and behaviors that affect risk of falls.  - [place] fall precautions as indicated by assessment.  - Educate pt/family on patient safety including physical limitations  - Instruct pt to call for assistance with activity based on assessment  - Modify environment to reduce risk of injury  - Provide assistive devices as appropriate  - Consider OT/PT consult to assist with strengthening/mobility  - Encourage toileting schedule  Outcome: Progressing     Problem: DISCHARGE PLANNING  Goal: Discharge to home or other facility with appropriate resources  Description: INTERVENTIONS:  - Identify barriers to discharge w/pt and  caregiver  - Include patient/family/discharge partner in discharge planning  - Arrange for needed discharge resources and transportation as appropriate  - Identify discharge learning needs (meds, wound care, etc)  - Arrange for interpreters to assist at discharge as needed  - Consider post-discharge preferences of patient/family/discharge partner  - Complete POLST form as appropriate  - Assess patient's ability to be responsible for managing their own health  - Refer to Case Management Department for coordinating discharge planning if the patient needs post-hospital services based on physician/LIP order or complex needs related to functional status, cognitive ability or social support system  Outcome: Progressing

## 2024-03-15 ENCOUNTER — PATIENT OUTREACH (OUTPATIENT)
Dept: CASE MANAGEMENT | Age: 49
End: 2024-03-15

## 2024-03-15 NOTE — PROGRESS NOTES
TCM chart review.  No TCM as patient follows with outside [facility] PCP.  Encounter closing.

## 2024-03-15 NOTE — PROGRESS NOTES
DM apt request (discharged 03/14)    Primary Doctor  Pt advised he sees a Dr Jaquez, couldn't find this Dr in EPIC or on the internet  Asked pt if he had a phone # for this Dr, pt advised he didn't have one and that he goes different places to see him, not at 1 office.  Pt advised he will call to schedule his apt  Closing encounter

## 2024-04-18 ENCOUNTER — APPOINTMENT (OUTPATIENT)
Dept: URBAN - METROPOLITAN AREA CLINIC 248 | Age: 49
Setting detail: DERMATOLOGY
End: 2024-04-23

## 2024-04-18 DIAGNOSIS — D49.2 NEOPLASM OF UNSPECIFIED BEHAVIOR OF BONE, SOFT TISSUE, AND SKIN: ICD-10-CM

## 2024-04-18 PROCEDURE — OTHER MIPS QUALITY: OTHER

## 2024-04-18 PROCEDURE — 11102 TANGNTL BX SKIN SINGLE LES: CPT

## 2024-04-18 PROCEDURE — OTHER BIOPSY BY SHAVE METHOD: OTHER

## 2024-04-18 ASSESSMENT — LOCATION SIMPLE DESCRIPTION DERM: LOCATION SIMPLE: PERINEUM

## 2024-04-18 ASSESSMENT — LOCATION DETAILED DESCRIPTION DERM: LOCATION DETAILED: PERINEUM

## 2024-04-18 ASSESSMENT — LOCATION ZONE DERM: LOCATION ZONE: PERINEUM

## 2024-04-18 NOTE — PROCEDURE: BIOPSY BY SHAVE METHOD
Detail Level: Detailed
Depth Of Biopsy: dermis
Was A Bandage Applied: Yes
Size Of Lesion In Cm: 0
Biopsy Type: H and E
Biopsy Method: Dermablade
Anesthesia Type: 1% lidocaine with epinephrine
Anesthesia Volume In Cc: 0.5
Hemostasis: Drysol
Wound Care: Petrolatum
Dressing: bandage
Destruction After The Procedure: No
Type Of Destruction Used: Curettage
Curettage Text: The wound bed was treated with curettage after the biopsy was performed.
Cryotherapy Text: The wound bed was treated with cryotherapy after the biopsy was performed.
Electrodesiccation Text: The wound bed was treated with electrodesiccation after the biopsy was performed.
Electrodesiccation And Curettage Text: The wound bed was treated with electrodesiccation and curettage after the biopsy was performed.
Silver Nitrate Text: The wound bed was treated with silver nitrate after the biopsy was performed.
Lab: -[id number]
Consent: Written consent was obtained and risks were reviewed including but not limited to scarring, infection, bleeding, scabbing, incomplete removal, nerve damage and allergy to anesthesia.
Post-Care Instructions: I reviewed with the patient in detail post-care instructions. Patient is to keep the biopsy site dry overnight, and then apply bacitracin twice daily until healed. Patient may apply hydrogen peroxide soaks to remove any crusting.
Notification Instructions: Patient will be notified of biopsy results. However, patient instructed to call the office if not contacted within 2 weeks.
Billing Type: Third-Party Bill
Information: Selecting Yes will display possible errors in your note based on the variables you have selected. This validation is only offered as a suggestion for you. PLEASE NOTE THAT THE VALIDATION TEXT WILL BE REMOVED WHEN YOU FINALIZE YOUR NOTE. IF YOU WANT TO FAX A PRELIMINARY NOTE YOU WILL NEED TO TOGGLE THIS TO 'NO' IF YOU DO NOT WANT IT IN YOUR FAXED NOTE.

## 2024-04-19 DIAGNOSIS — F90.2 ATTENTION DEFICIT HYPERACTIVITY DISORDER (ADHD), COMBINED TYPE: ICD-10-CM

## 2024-04-19 RX ORDER — DEXTROAMPHETAMINE SACCHARATE, AMPHETAMINE ASPARTATE MONOHYDRATE, DEXTROAMPHETAMINE SULFATE AND AMPHETAMINE SULFATE 6.25; 6.25; 6.25; 6.25 MG/1; MG/1; MG/1; MG/1
25 CAPSULE, EXTENDED RELEASE ORAL DAILY
Qty: 30 CAPSULE | Refills: 0 | Status: SHIPPED | OUTPATIENT
Start: 2024-04-19

## 2024-05-29 DIAGNOSIS — F90.2 ATTENTION DEFICIT HYPERACTIVITY DISORDER (ADHD), COMBINED TYPE: ICD-10-CM

## 2024-05-29 RX ORDER — DEXTROAMPHETAMINE SACCHARATE, AMPHETAMINE ASPARTATE MONOHYDRATE, DEXTROAMPHETAMINE SULFATE AND AMPHETAMINE SULFATE 6.25; 6.25; 6.25; 6.25 MG/1; MG/1; MG/1; MG/1
25 CAPSULE, EXTENDED RELEASE ORAL DAILY
Qty: 30 CAPSULE | Refills: 0 | Status: SHIPPED | OUTPATIENT
Start: 2024-05-29

## 2024-05-30 NOTE — H&P
[facility] - Gastroenterology                                                                                                               Reason for consult: eval    Requesting physician or provider: None Pcp    Chief Complaint   Patient presents with    Colonoscopy Screening       HPI:   Clarence Poole is a 49 year old year-old male with history of adhd, dld, htn, sleep apnea:    he is here today for evaluation  Prior to cln screening    he moves his bowels daily and without recent change. he denies straining and/or incomplete evacuation.  Has diarrhea with urgency at times. Dairy is trigger, but has symptoms despite avoidance. No nocturnal bm. Started metformin in April. No worsening of sx since starting therapy. Has gb. he denies brbpr and/or melena.    he denies acid reflux and/or heartburn. he denies dysphagia, odynophagia and/or globus. he denies abdominal pain. he denies nausea and/or vomiting.  he denies recent change in appetite and/or unintentional weight loss.    Ttg normal - no igaa (2018)  NSAIDS: no  Tobacco: no  Alcohol: social  Marijuana: no  Illicit drugs: no    No FH GI malignancy, ibd, celiac  Maternal gf had a cancer of unknown origin    No history of adverse reaction to sedation  CHELA  No anticoagulants  No pacemaker/defibrillator  No pain medications and/or sleep aides    Last colonoscopy: no  Last EGD: no    Wt Readings from Last 6 Encounters:   06/04/24 241 lb 4.8 oz (109.5 kg)   03/14/24 235 lb 3.2 oz (106.7 kg)   03/17/20 220 lb (99.8 kg)   04/09/18 234 lb 3.2 oz (106.2 kg)   03/19/18 230 lb (104.3 kg)   03/18/18 230 lb (104.3 kg)        History, Medications, Allergies, ROS:      Past Medical History:    ADHD    Dyslipidemia    Essential hypertension    SLEEP APNEA    hx sleep apnea - but lost wt, still snores but no more apnea      Past Surgical History:   Procedure Laterality Date    Other  surgical history  1994    uvula abscess drained      Family Hx:   Family History   Problem Relation Age of Onset    Hypertension Father     Hypertension Mother     Cancer Maternal Grandmother         breast    Diabetes Maternal Grandmother     Heart Disorder Maternal Grandmother         CAD/MI - late in life    Other (Other) Maternal Grandmother     Diabetes Maternal Grandfather     Diabetes Paternal Grandmother     Diabetes Paternal Grandfather     Heart Disorder Other     Lipids Neg       Social History:   Social History     Socioeconomic History    Marital status: Single   Tobacco Use    Smoking status: Never    Smokeless tobacco: Never   Vaping Use    Vaping status: Never Used   Substance and Sexual Activity    Alcohol use: Yes     Comment: 1 per mth,socially    Drug use: No     Social Determinants of Health     Food Insecurity: Unknown (3/13/2024)    Food Insecurity     Food Insecurity: Patient declined   Transportation Needs: Unknown (3/13/2024)    Transportation Needs     Lack of Transportation: Patient declined   Housing Stability: Unknown (3/13/2024)    Housing Stability     Housing Instability: Patient declined        Medications (Active prior to today's visit):  Current Outpatient Medications   Medication Sig Dispense Refill    carvedilol 25 MG Oral Tab Take 1 tablet (25 mg total) by mouth 2 (two) times daily with meals.      cholecalciferol 125 MCG (5000 UT) Oral Cap Take 1 tablet by mouth daily.      fluconazole 200 MG Oral Tab Take 1 tablet (200 mg total) by mouth every other day.      atorvastatin 40 MG Oral Tab Take 1 tablet (40 mg total) by mouth daily.      PEG 3350-KCl-Na Bicarb-NaCl (TRILYTE) 420 g Oral Recon Soln Take prep as directed by gastro office. May substitute with Trilyte/generic equivalent if needed. 4000 mL 0    amLODIPine 10 MG Oral Tab Take 1 tablet (10 mg total) by mouth daily. 30 tablet 0    atorvastatin 20 MG Oral Tab Take 1 tablet (20 mg total) by mouth nightly. 30 tablet 0     hydroCHLOROthiazide 12.5 MG Oral Tab Take 1 tablet (12.5 mg total) by mouth daily. 30 tablet 0    Blood Glucose Monitoring Suppl (ONETOUCH VERIO FLEX SYSTEM) w/Device Does not apply Kit Use as directed. 1 kit 0    Glucose Blood (ONETOUCH VERIO) In Vitro Strip Use as directed. 100 strip 6    OneTouch Delica Lancets 33G Does not apply Misc Use as directed 100 each 6    Irbesartan 300 MG Oral Tab Take 1 tablet (300 mg total) by mouth daily.      Amphetamine-Dextroamphet ER 25 MG Oral Capsule SR 24 Hr       metFORMIN HCl 1000 MG Oral Tab Take 1 tablet (1,000 mg total) by mouth 2 (two) times daily with meals.      Olmesartan Medoxomil-HCTZ 40-25 MG Oral Tab Take 1 tablet by mouth daily.         Allergies:  Allergies   Allergen Reactions    Penicillins RASH       ROS:   CONSTITUTIONAL: negative for fevers, chills, sweats and weight loss  EYES Negative for red eyes, yellow eyes, changes in vision  HEENT: Negative for dysphagia and hoarseness  RESPIRATORY: Negative for cough and shortness of breath  CARDIOVASCULAR: Negative for chest pain, palpitations  GASTROINTESTINAL: See HPI  GENITOURINARY: Negative for dysuria and frequency  MUSCULOSKELETAL: Negative for arthralgias and myalgias  NEUROLOGICAL: Negative for dizziness and headaches  BEHAVIOR/PSYCH: Negative for anxiety and poor appetite    PHYSICAL EXAM:   Blood pressure (!) 143/96, pulse 73, height 6' 3\" (1.905 m), weight 241 lb 4.8 oz (109.5 kg).    GEN: WD/WN, NAD  HEENT: Supple symmetrical, trachea midline  CV: RRR, the extremities are warm and well perfused   LUNGS: No increased work of breathing  ABDOMEN: No scars, normal bowel sounds, soft, non-tender, non-distended no rebound or guarding, no masses, no hepatomegaly  MSK: No redness, no warmth, no swelling of joints  SKIN: No jaundice, no erythema, no rashes  HEMATOLOGIC: No bleeding, no bruising  NEURO: Alert and interactive, normal gait    Labs/Imaging/Procedures:     Patient's pertinent labs and imaging were  reviewed and discussed with patient today.        .  ASSESSMENT/PLAN:   Clarence Poole is a 49 year old year-old male with history of adhd, dld, htn, sleep apnea:    #diarrhea  #crc screening  He is here today as a referral from his PCP for evaluation prior to undergoing colonoscopy for CRC screening.  He denies red flags such as recent change in bm, brbpr, and/or melena.  Chronic, intermittent diarrhea. He denies a FDR w/ GI malignancy, IBD, celiac.  Has not had cln and is now due for screening.     -labs  -avoid dairy  -ibgard as needed    1. Schedule colonoscopy with [facility] w/ Dr. Roy or Dr. Kruger [Diagnosis: crc screening]    2.  bowel prep from pharmacy (split trilyte)    3. Hold adderall 72h prior to procedure  Hold metformin day before and am of procedure    4. Read all bowel prep instructions carefully. Bowel prep instructions can also be found online at:  [email or web address]     5. AVOID seeds, nuts, popcorn, raw fruits and vegetables for 3 days before procedure    6. You MAY need to go for COVID testing 72 hours before procedure. The testing team will call you a few days before your procedure to discuss with you if testing is required. If you are asked to go for COVID testing and do not completed the test, the procedure cannot be performed.     7. If you start any NEW medication after your visit today, please notify us. Certain medications (like iron or weight loss medications) will need to be held before the procedure, or the procedure cannot be performed safely.      Orders This Visit:  Orders Placed This Encounter   Procedures    C-Reactive Protein    Tissue Transglutaminase Ab, IgA    Immunoglobulin A, Qn, Serum (IGA)    TSH W Reflex To Free T4    Adult Food Allergy Prof       Meds This Visit:  Requested Prescriptions     Signed Prescriptions Disp Refills    PEG 3350-KCl-Na Bicarb-NaCl (TRILYTE) 420 g Oral Recon Soln 4000 mL 0     Sig: Take prep as directed by gastro office. May substitute  with Trilyte/generic equivalent if needed.       Imaging & Referrals:  None    ENDOSCOPIC RISK BENEFIT DISCUSSION: I described the procedure in great detail with the patient. I discussed the risks and benefits, including but not limited to: bleeding, perforation, infection, anesthesia complications, and even death. Patient will be NPO after midnight and will have a person physically present at time of pick-up to drive patient home. Patient verbalized understanding and agrees to proceed with procedure as planned.    Akosua Bañuelos, APRN   6/3/2024        This note was partially prepared using Dragon Medical voice recognition dictation software. As a result, errors may occur. When identified, these errors have been corrected. While every attempt is made to correct errors during dictation, discrepancies may still exist.

## 2024-06-04 ENCOUNTER — LAB ENCOUNTER (OUTPATIENT)
Dept: LAB | Facility: HOSPITAL | Age: 49
End: 2024-06-04
Attending: NURSE PRACTITIONER
Payer: COMMERCIAL

## 2024-06-04 ENCOUNTER — TELEPHONE (OUTPATIENT)
Facility: CLINIC | Age: 49
End: 2024-06-04

## 2024-06-04 ENCOUNTER — OFFICE VISIT (OUTPATIENT)
Facility: CLINIC | Age: 49
End: 2024-06-04

## 2024-06-04 VITALS
BODY MASS INDEX: 30 KG/M2 | SYSTOLIC BLOOD PRESSURE: 152 MMHG | DIASTOLIC BLOOD PRESSURE: 94 MMHG | HEART RATE: 73 BPM | WEIGHT: 241.31 LBS | HEIGHT: 75 IN

## 2024-06-04 DIAGNOSIS — R19.7 DIARRHEA, UNSPECIFIED TYPE: ICD-10-CM

## 2024-06-04 DIAGNOSIS — Z12.11 COLON CANCER SCREENING: Primary | ICD-10-CM

## 2024-06-04 DIAGNOSIS — R19.7 DIARRHEA, UNSPECIFIED TYPE: Primary | ICD-10-CM

## 2024-06-04 DIAGNOSIS — Z12.11 COLON CANCER SCREENING: ICD-10-CM

## 2024-06-04 LAB
CRP SERPL-MCNC: <0.4 MG/DL (ref ?–1)
IGA SERPL-MCNC: 354.8 MG/DL (ref 40–350)
TSI SER-ACNC: 0.86 MIU/ML (ref 0.55–4.78)

## 2024-06-04 PROCEDURE — 3080F DIAST BP >= 90 MM HG: CPT | Performed by: NURSE PRACTITIONER

## 2024-06-04 PROCEDURE — 82785 ASSAY OF IGE: CPT

## 2024-06-04 PROCEDURE — 84443 ASSAY THYROID STIM HORMONE: CPT

## 2024-06-04 PROCEDURE — 3008F BODY MASS INDEX DOCD: CPT | Performed by: NURSE PRACTITIONER

## 2024-06-04 PROCEDURE — S0285 CNSLT BEFORE SCREEN COLONOSC: HCPCS | Performed by: NURSE PRACTITIONER

## 2024-06-04 PROCEDURE — 86364 TISS TRNSGLTMNASE EA IG CLAS: CPT

## 2024-06-04 PROCEDURE — 3077F SYST BP >= 140 MM HG: CPT | Performed by: NURSE PRACTITIONER

## 2024-06-04 PROCEDURE — 86003 ALLG SPEC IGE CRUDE XTRC EA: CPT

## 2024-06-04 PROCEDURE — 36415 COLL VENOUS BLD VENIPUNCTURE: CPT

## 2024-06-04 PROCEDURE — 86140 C-REACTIVE PROTEIN: CPT

## 2024-06-04 PROCEDURE — 82784 ASSAY IGA/IGD/IGG/IGM EACH: CPT

## 2024-06-04 RX ORDER — OLMESARTAN MEDOXOMIL AND HYDROCHLOROTHIAZIDE 40/25 40; 25 MG/1; MG/1
1 TABLET ORAL DAILY
COMMUNITY

## 2024-06-04 RX ORDER — ATORVASTATIN CALCIUM 40 MG/1
40 TABLET, FILM COATED ORAL DAILY
COMMUNITY
Start: 2024-04-11

## 2024-06-04 RX ORDER — FLUCONAZOLE 200 MG/1
200 TABLET ORAL EVERY OTHER DAY
COMMUNITY
Start: 2024-01-15

## 2024-06-04 RX ORDER — CARVEDILOL 25 MG/1
25 TABLET ORAL 2 TIMES DAILY WITH MEALS
COMMUNITY
Start: 2024-04-05

## 2024-06-04 RX ORDER — POLYETHYLENE GLYCOL 3350, SODIUM CHLORIDE, SODIUM BICARBONATE, POTASSIUM CHLORIDE 420; 11.2; 5.72; 1.48 G/4L; G/4L; G/4L; G/4L
POWDER, FOR SOLUTION ORAL
Qty: 4000 ML | Refills: 0 | Status: SHIPPED | OUTPATIENT
Start: 2024-06-04

## 2024-06-04 NOTE — TELEPHONE ENCOUNTER
Scheduled for:  Colonoscopy 20442  Provider Name:  Dr. Kruger  Date:  09/24/24  Location:[facility]  Sedation:  MAC  Time: 0930 Am ,(pt is aware that [facility] will call the day before to confirm arrival time)   Prep:  Trilyte  Meds/Allergies Reconciled?:  DIOGENES Galvan Reviewed  Diagnosis with codes: Diarrhea R19.7, Colon cancer screening Z12.11   Was patient informed to call insurance with codes (Y/N):  Yes  Referral sent?:  Referral was sent at the time of electronic surgical scheduling.  [facility] or [facility] notified?:  I sent an electronic request to Endo Scheduling and received a confirmation today.  Medication Orders:  Pt is aware to NOT take iron pills, herbal meds and diet supplements for 7 days before exam. Also to NOT take any form of alcohol, recreational drugs and any forms of ED meds 24 hours before exam.    Hold adderall 72h prior to procedure  Hold metformin day before and am of procedure  Misc Orders:  Patient was informed about the new cancellation policy for his/her procedure. Patient was also given a copy of the cancellation policy at the time of the appointment and verbalized understanding.      Further instructions given by staff:  I provide prep instructions to patient at the time of the appointment and reviewed date and location, patient verbalized that he understood and is aware to call if he has any questions.

## 2024-06-04 NOTE — PATIENT INSTRUCTIONS
-labs  -avoid dairy  -ibgard as needed  -fibercon or citrucel    1. Schedule colonoscopy with [facility] w/ Dr. oRy or Dr. Kruger [Diagnosis: crc screening]    2.  bowel prep from pharmacy (split trilyte)    3. Hold adderall 72h prior to procedure  Hold metformin day before and am of procedure    4. Read all bowel prep instructions carefully. Bowel prep instructions can also be found online at:  [email or web address]     5. AVOID seeds, nuts, popcorn, raw fruits and vegetables for 3 days before procedure    6. You MAY need to go for COVID testing 72 hours before procedure. The testing team will call you a few days before your procedure to discuss with you if testing is required. If you are asked to go for COVID testing and do not completed the test, the procedure cannot be performed.     7. If you start any NEW medication after your visit today, please notify us. Certain medications (like iron or weight loss medications) will need to be held before the procedure, or the procedure cannot be performed safely.

## 2024-06-05 LAB — TTG IGA SER-ACNC: 1.2 U/ML (ref ?–7)

## 2024-06-08 LAB
ALLERGEN BRAZIL NUT: <0.1 KUA/L (ref ?–0.1)
ALMOND IGE QN: <0.1 KUA/L (ref ?–0.1)
CASHEW NUT IGE QN: <0.1 KUA/L (ref ?–0.1)
CLAM IGE QN: <0.1 KUA/L (ref ?–0.1)
CODFISH IGE QN: <0.1 KUA/L (ref ?–0.1)
CORN IGE QN: <0.1 KUA/L (ref ?–0.1)
COW MILK IGE QN: <0.1 KUA/L (ref ?–0.1)
EGG WHITE IGE QN: <0.1 KUA/L (ref ?–0.1)
HAZELNUT IGE QN: <0.1 KUA/L (ref ?–0.1)
IGE SERPL-ACNC: 15.1 KU/L (ref 2–214)
PEANUT IGE QN: <0.1 KUA/L (ref ?–0.1)
SALMON IGE QN: <0.1 KUA/L (ref ?–0.1)
SCALLOP IGE QN: <0.1 KUA/L (ref ?–0.1)
SESAME SEED IGE QN: <0.1 KUA/L (ref ?–0.1)
SHRIMP IGE QN: <0.1 KUA/L (ref ?–0.1)
SOYBEAN IGE QN: <0.1 KUA/L (ref ?–0.1)
WALNUT IGE QN: <0.1 KUA/L (ref ?–0.1)
WHEAT IGE QN: <0.1 KUA/L (ref ?–0.1)

## 2024-06-11 ENCOUNTER — APPOINTMENT (OUTPATIENT)
Dept: BEHAVIORAL HEALTH | Age: 49
End: 2024-06-11

## 2024-07-05 ENCOUNTER — E-ADVICE (OUTPATIENT)
Dept: BEHAVIORAL HEALTH | Age: 49
End: 2024-07-05

## 2024-08-08 ENCOUNTER — APPOINTMENT (OUTPATIENT)
Dept: URBAN - METROPOLITAN AREA CLINIC 248 | Age: 49
Setting detail: DERMATOLOGY
End: 2024-08-08

## 2024-08-08 DIAGNOSIS — L30.4 ERYTHEMA INTERTRIGO: ICD-10-CM

## 2024-08-08 PROCEDURE — OTHER MIPS QUALITY: OTHER

## 2024-08-08 PROCEDURE — 99213 OFFICE O/P EST LOW 20 MIN: CPT

## 2024-08-08 PROCEDURE — OTHER PRESCRIPTION: OTHER

## 2024-08-08 PROCEDURE — OTHER PRESCRIPTION MEDICATION MANAGEMENT: OTHER

## 2024-08-08 PROCEDURE — OTHER ADDITIONAL NOTES: OTHER

## 2024-08-08 PROCEDURE — OTHER COUNSELING: OTHER

## 2024-08-08 RX ORDER — HYDROCORTISONE 25 MG/G
CREAM TOPICAL
Qty: 30 | Refills: 2 | Status: ERX | COMMUNITY
Start: 2024-08-08

## 2024-08-08 RX ORDER — MUPIROCIN 20 MG/G
OINTMENT TOPICAL
Qty: 22 | Refills: 2 | Status: ERX | COMMUNITY
Start: 2024-08-08

## 2024-08-08 RX ORDER — KETOCONAZOLE 20 MG/G
CREAM TOPICAL
Qty: 60 | Refills: 2 | Status: ERX | COMMUNITY
Start: 2024-08-08

## 2024-08-08 ASSESSMENT — LOCATION DETAILED DESCRIPTION DERM: LOCATION DETAILED: RIGHT MEDIAL POSTERIOR THIGH

## 2024-08-08 ASSESSMENT — LOCATION ZONE DERM: LOCATION ZONE: LEG

## 2024-08-08 ASSESSMENT — LOCATION SIMPLE DESCRIPTION DERM: LOCATION SIMPLE: RIGHT THIGH

## 2024-08-08 NOTE — PROCEDURE: ADDITIONAL NOTES
Additional Notes: Pt instructed to mix 3 topical medications together and apply BID for up to 2 weeks, may repeat if necessary after taking a 1 week break in between. Will f/u if condition persists after that.
Detail Level: Simple
Render Risk Assessment In Note?: no

## 2024-08-08 NOTE — PROCEDURE: PRESCRIPTION MEDICATION MANAGEMENT
Initiate Treatment: ketoconazole 2 % topical cream\\nSig: Apply to AA BID\\n\\nmupirocin 2 % topical ointment \\nSig: Apply to AA BID\\n\\nhydrocortisone 2.5 % topical cream\\nSig: Apply to AA BID
Detail Level: Zone
Render In Strict Bullet Format?: No

## 2024-08-15 DIAGNOSIS — F90.2 ATTENTION DEFICIT HYPERACTIVITY DISORDER (ADHD), COMBINED TYPE: ICD-10-CM

## 2024-08-15 RX ORDER — DEXTROAMPHETAMINE SACCHARATE, AMPHETAMINE ASPARTATE MONOHYDRATE, DEXTROAMPHETAMINE SULFATE AND AMPHETAMINE SULFATE 3.75; 3.75; 3.75; 3.75 MG/1; MG/1; MG/1; MG/1
15 CAPSULE, EXTENDED RELEASE ORAL DAILY
Qty: 30 CAPSULE | Refills: 0 | Status: SHIPPED | OUTPATIENT
Start: 2024-08-15

## 2024-08-27 ENCOUNTER — APPOINTMENT (OUTPATIENT)
Dept: BEHAVIORAL HEALTH | Age: 49
End: 2024-08-27

## 2024-08-27 DIAGNOSIS — F90.2 ATTENTION DEFICIT HYPERACTIVITY DISORDER (ADHD), COMBINED TYPE: ICD-10-CM

## 2024-08-27 DIAGNOSIS — F51.01 PRIMARY INSOMNIA: Primary | ICD-10-CM

## 2024-08-27 PROCEDURE — 99214 OFFICE O/P EST MOD 30 MIN: CPT | Performed by: PSYCHIATRY & NEUROLOGY

## 2024-08-27 RX ORDER — QUETIAPINE FUMARATE 25 MG/1
50 TABLET, FILM COATED ORAL NIGHTLY PRN
Qty: 30 TABLET | Refills: 0 | Status: SHIPPED | OUTPATIENT
Start: 2024-08-27

## 2024-08-27 RX ORDER — DEXTROAMPHETAMINE SACCHARATE, AMPHETAMINE ASPARTATE MONOHYDRATE, DEXTROAMPHETAMINE SULFATE AND AMPHETAMINE SULFATE 3.75; 3.75; 3.75; 3.75 MG/1; MG/1; MG/1; MG/1
15 CAPSULE, EXTENDED RELEASE ORAL DAILY
Qty: 30 CAPSULE | Refills: 0 | Status: SHIPPED | OUTPATIENT
Start: 2024-08-27

## 2024-08-30 ENCOUNTER — HOSPITAL ENCOUNTER (OUTPATIENT)
Age: 49
Discharge: HOME | End: 2024-08-30
Payer: COMMERCIAL

## 2024-08-30 ENCOUNTER — APPOINTMENT (OUTPATIENT)
Dept: GENERAL RADIOLOGY | Age: 49
End: 2024-08-30
Attending: NURSE PRACTITIONER
Payer: COMMERCIAL

## 2024-08-30 VITALS
SYSTOLIC BLOOD PRESSURE: 149 MMHG | RESPIRATION RATE: 16 BRPM | TEMPERATURE: 98 F | HEART RATE: 73 BPM | DIASTOLIC BLOOD PRESSURE: 96 MMHG | OXYGEN SATURATION: 100 %

## 2024-08-30 DIAGNOSIS — M25.562 ACUTE PAIN OF LEFT KNEE: Primary | ICD-10-CM

## 2024-08-30 PROCEDURE — 99213 OFFICE O/P EST LOW 20 MIN: CPT

## 2024-08-30 PROCEDURE — 73560 X-RAY EXAM OF KNEE 1 OR 2: CPT | Performed by: NURSE PRACTITIONER

## 2024-08-30 PROCEDURE — 99214 OFFICE O/P EST MOD 30 MIN: CPT

## 2024-08-30 NOTE — ED INITIAL ASSESSMENT (HPI)
Patient states that a couple weeks ago he was sleeping on the ground to stay cool when his AC went out   He says since then he has intermittent sharp pain to the inner aspect of this knee  He says pain can go up to a 10/10  He has tried a knee brace and some topical ointment   States certain movements makes it worse

## 2024-08-30 NOTE — ED PROVIDER NOTES
Patient Seen in: [facility]      History     Chief Complaint   Patient presents with    Knee Pain     Stated Complaint: Pain in Left Knee    Subjective:   HPI    49-year-old male presents to the immediate care with intermittent sharp pain in the left knee.  He states he noticed it months ago but has not been able to make it to the doctor.  He noticed the pain again after sleeping on the floor a few weeks ago.  No swelling.  No redness.  No trauma.  No injury.  He states he is a  and is constantly moving from a squatting to standing position.  He did use an over-the-counter menthol ointment without relief.  Certain movements do make the pain occur.    Objective:   No pertinent past medical history.            No pertinent past surgical history.              No pertinent social history.            Review of Systems    Positive for stated Chief Complaint: Knee Pain    Other systems are as noted in HPI.  Constitutional and vital signs reviewed.      All other systems reviewed and negative except as noted above.    Physical Exam     ED Triage Vitals [08/30/24 1630]   BP (!) 149/96   Pulse 73   Resp 16   Temp 97.9 °F (36.6 °C)   Temp src Temporal   SpO2 100 %   O2 Device None (Room air)       Current Vitals:   Vital Signs  BP: (!) 149/96  Pulse: 73  Resp: 16  Temp: 97.9 °F (36.6 °C)  Temp src: Temporal    Oxygen Therapy  SpO2: 100 %  O2 Device: None (Room air)            Physical Exam  Vitals and nursing note reviewed.   Constitutional:       Appearance: Normal appearance.   Cardiovascular:      Rate and Rhythm: Normal rate.      Pulses: Normal pulses.   Pulmonary:      Effort: Pulmonary effort is normal.   Musculoskeletal:         General: Tenderness present. No swelling or deformity.      Comments: Point tender to the left medial proximal knee  No laxity full range of motion without significant pain no redness no effusion no calf pain   Skin:     General: Skin is warm and dry.      Capillary Refill:  Capillary refill takes less than 2 seconds.   Neurological:      Mental Status: He is alert and oriented to person, place, and time.               ED Course   Labs Reviewed - No data to display          X-ray rule out acute bony process         MDM         Sprain, strain, meniscus injury, arthritis, bony abnl considered  Personally reviewed the x-ray there is no bony abnormality  Advised ice, ibuprofen or Tylenol Voltaren gel if needed Ace wrap close follow-up with PMD versus Ortho if symptoms do not improve                             Medical Decision Making  Problems Addressed:  Acute pain of left knee: acute illness or injury with systemic symptoms that poses a threat to life or bodily functions    Amount and/or Complexity of Data Reviewed  Radiology: ordered and independent interpretation performed. Decision-making details documented in ED Course.    Risk  OTC drugs.  Prescription drug management.        Disposition and Plan     Clinical Impression:  1. Acute pain of left knee         Disposition:  Discharge  8/30/2024  5:20 pm    Follow-up:  Jack Hernandez MD  [address] [address]  [phone]    Schedule an appointment as soon as possible for a visit   As needed          Medications Prescribed:  Discharge Medication List as of 8/30/2024  5:22 PM        START taking these medications    Details   diclofenac 1 % External Gel Apply 2 g topically 4 (four) times daily as needed., Normal, Disp-100 g, R-0

## 2024-08-30 NOTE — DISCHARGE INSTRUCTIONS
Ice  Tylenol or ibuprofen as needed for pain Ace wrap for comfort follow-up with orthopedics if pain persists

## 2024-09-24 PROCEDURE — 88305 TISSUE EXAM BY PATHOLOGIST: CPT | Performed by: INTERNAL MEDICINE

## 2024-09-30 DIAGNOSIS — F90.2 ATTENTION DEFICIT HYPERACTIVITY DISORDER (ADHD), COMBINED TYPE: ICD-10-CM

## 2024-09-30 RX ORDER — DEXTROAMPHETAMINE SACCHARATE, AMPHETAMINE ASPARTATE MONOHYDRATE, DEXTROAMPHETAMINE SULFATE AND AMPHETAMINE SULFATE 3.75; 3.75; 3.75; 3.75 MG/1; MG/1; MG/1; MG/1
15 CAPSULE, EXTENDED RELEASE ORAL DAILY
Qty: 15 CAPSULE | Refills: 0 | Status: SHIPPED | OUTPATIENT
Start: 2024-09-30

## 2024-10-01 ENCOUNTER — TELEPHONE (OUTPATIENT)
Facility: CLINIC | Age: 49
End: 2024-10-01

## 2024-10-01 NOTE — TELEPHONE ENCOUNTER
----- Message from Doris Kruger sent at 9/25/2024  2:51 PM CDT -----  GI staff: please place recall for colonoscopy in 7 years

## 2024-10-01 NOTE — TELEPHONE ENCOUNTER
Recall colon in 7 years per Dr. Kruger. Colonoscopy done on 9/24/24.    Health maintenance updated and message sent to pt outreach to repeat colon in 7 years.    MyChart message sent by MD.

## 2024-10-14 ENCOUNTER — APPOINTMENT (OUTPATIENT)
Dept: BEHAVIORAL HEALTH | Age: 49
End: 2024-10-14

## 2024-10-14 DIAGNOSIS — F90.2 ATTENTION DEFICIT HYPERACTIVITY DISORDER (ADHD), COMBINED TYPE: ICD-10-CM

## 2024-10-14 DIAGNOSIS — F51.01 PRIMARY INSOMNIA: Primary | ICD-10-CM

## 2024-10-14 PROCEDURE — 99214 OFFICE O/P EST MOD 30 MIN: CPT | Performed by: PSYCHIATRY & NEUROLOGY

## 2024-10-14 RX ORDER — DEXTROAMPHETAMINE SACCHARATE, AMPHETAMINE ASPARTATE MONOHYDRATE, DEXTROAMPHETAMINE SULFATE AND AMPHETAMINE SULFATE 3.75; 3.75; 3.75; 3.75 MG/1; MG/1; MG/1; MG/1
15 CAPSULE, EXTENDED RELEASE ORAL DAILY
Qty: 30 CAPSULE | Refills: 0 | Status: SHIPPED | OUTPATIENT
Start: 2024-10-14

## 2024-10-25 DIAGNOSIS — F90.2 ATTENTION DEFICIT HYPERACTIVITY DISORDER (ADHD), COMBINED TYPE: ICD-10-CM

## 2024-10-25 RX ORDER — DEXTROAMPHETAMINE SACCHARATE, AMPHETAMINE ASPARTATE MONOHYDRATE, DEXTROAMPHETAMINE SULFATE AND AMPHETAMINE SULFATE 3.75; 3.75; 3.75; 3.75 MG/1; MG/1; MG/1; MG/1
15 CAPSULE, EXTENDED RELEASE ORAL DAILY
Qty: 30 CAPSULE | Refills: 0 | Status: SHIPPED | OUTPATIENT
Start: 2024-10-25

## 2024-11-11 ENCOUNTER — APPOINTMENT (OUTPATIENT)
Dept: BEHAVIORAL HEALTH | Age: 49
End: 2024-11-11

## 2024-11-11 DIAGNOSIS — F90.2 ATTENTION DEFICIT HYPERACTIVITY DISORDER (ADHD), COMBINED TYPE: ICD-10-CM

## 2024-11-11 DIAGNOSIS — F51.01 PRIMARY INSOMNIA: Primary | ICD-10-CM

## 2024-11-11 PROCEDURE — 99214 OFFICE O/P EST MOD 30 MIN: CPT | Performed by: PSYCHIATRY & NEUROLOGY

## 2024-11-11 RX ORDER — ATORVASTATIN CALCIUM 40 MG/1
40 TABLET, FILM COATED ORAL DAILY
COMMUNITY

## 2024-11-11 RX ORDER — DEXTROAMPHETAMINE SACCHARATE, AMPHETAMINE ASPARTATE MONOHYDRATE, DEXTROAMPHETAMINE SULFATE AND AMPHETAMINE SULFATE 3.75; 3.75; 3.75; 3.75 MG/1; MG/1; MG/1; MG/1
15 CAPSULE, EXTENDED RELEASE ORAL DAILY
Qty: 30 CAPSULE | Refills: 0 | Status: SHIPPED | OUTPATIENT
Start: 2024-11-11

## 2024-11-11 RX ORDER — CARVEDILOL 25 MG/1
25 TABLET ORAL 2 TIMES DAILY WITH MEALS
COMMUNITY

## 2024-12-01 ENCOUNTER — HOSPITAL ENCOUNTER (OUTPATIENT)
Age: 49
Discharge: HOME | End: 2024-12-01
Attending: STUDENT IN AN ORGANIZED HEALTH CARE EDUCATION/TRAINING PROGRAM
Payer: COMMERCIAL

## 2024-12-01 VITALS
TEMPERATURE: 98 F | HEART RATE: 70 BPM | RESPIRATION RATE: 18 BRPM | DIASTOLIC BLOOD PRESSURE: 89 MMHG | SYSTOLIC BLOOD PRESSURE: 146 MMHG | OXYGEN SATURATION: 97 %

## 2024-12-01 DIAGNOSIS — J00 ACUTE RHINITIS: ICD-10-CM

## 2024-12-01 DIAGNOSIS — H69.92 DYSFUNCTION OF LEFT EUSTACHIAN TUBE: ICD-10-CM

## 2024-12-01 DIAGNOSIS — R21 RASH: Primary | ICD-10-CM

## 2024-12-01 DIAGNOSIS — H61.22 IMPACTED CERUMEN OF LEFT EAR: ICD-10-CM

## 2024-12-01 DIAGNOSIS — R03.0 ELEVATED BLOOD PRESSURE READING: ICD-10-CM

## 2024-12-01 PROCEDURE — 99213 OFFICE O/P EST LOW 20 MIN: CPT

## 2024-12-01 PROCEDURE — 69209 REMOVE IMPACTED EAR WAX UNI: CPT

## 2024-12-01 RX ORDER — PERMETHRIN 50 MG/G
CREAM TOPICAL
Qty: 60 G | Refills: 0 | Status: SHIPPED | OUTPATIENT
Start: 2024-12-01

## 2024-12-01 NOTE — DISCHARGE INSTRUCTIONS
There was earwax in the left ear canal which was removed as this can contribute to your symptoms as well as it was blocking view of the eardrum.      On examination of the eardrum after earwax removal, there is small area of air-fluid level called ear effusion which can cause muffling of hearing, vertiginous symptoms, fullness of the ear, and sensation like the ear needs to be drained.      You also have nasal mucosal swelling called rhinitis and in the setting of rhinitis there can be eustachian tube dysfunction in which the tube that communicates from the nose to the ear does not move as well and therefore fluid can build up behind the ear.  I recommend over-the-counter intranasal Flonase for rhinitis.  Given the duration of your symptoms, I do recommend follow-up with ENT for reassessment and for further recommendations.  At this time there is no sign of infection of the eardrum or ear canal to indicate antibiotics, with any new, changing, or progressing signs or symptoms, I do recommend immediate reassessment.    Your rash is concerning for possible contact dermatitis which is an allergic reaction to an unknown exposure, please ensure that you have not had any new detergents, shampoos, soaps, conditioners, or other exposure that could have caused the reaction and eliminate any exposures.  You can use hypoallergenic soaps and non-fragrance soaps to help prevent further exposure as well as pat dry after showering and use non-fragrance body lotion to help with dryness of skin.    It is unclear if rash could be due to a mite called scabies, and I therefore have prescribed permethrin to be applied to the skin as prescribed to help kill any possible mites that could be contributing to your symptoms.  You should also wash all bedding and clothes on high heat and dry on high heat to kill off any lingering mites.  Given unclear etiology to the rash, and limitations of the immediate care, I do recommend follow-up with  your dermatologist for reassessment and for further recommendations.  We discussed that you can apply over-the-counter hydrocortisone to certain areas of itching to help decrease itching, but should not be applied diffusely over the body.  You can also take an over-the-counter antihistamine such as Claritin to help decrease itching.    Your blood pressure was noted to be elevated, please follow up with your primary care physician for reassessment and further recommendations.

## 2024-12-01 NOTE — ED INITIAL ASSESSMENT (HPI)
Patient reports a muffled sound to both ears, Patient states he has more of a muffled sound to his left ear, however this is intermittent. Patient denies any fevers. Patient also with a rash to his right hand and to his back that started Wednesday night.

## 2024-12-09 ENCOUNTER — APPOINTMENT (OUTPATIENT)
Dept: BEHAVIORAL HEALTH | Age: 49
End: 2024-12-09

## 2024-12-09 DIAGNOSIS — F90.2 ATTENTION DEFICIT HYPERACTIVITY DISORDER (ADHD), COMBINED TYPE: ICD-10-CM

## 2024-12-09 DIAGNOSIS — F51.01 PRIMARY INSOMNIA: Primary | ICD-10-CM

## 2024-12-09 PROCEDURE — 99214 OFFICE O/P EST MOD 30 MIN: CPT | Performed by: PSYCHIATRY & NEUROLOGY

## 2024-12-09 RX ORDER — DEXTROAMPHETAMINE SACCHARATE, AMPHETAMINE ASPARTATE MONOHYDRATE, DEXTROAMPHETAMINE SULFATE AND AMPHETAMINE SULFATE 3.75; 3.75; 3.75; 3.75 MG/1; MG/1; MG/1; MG/1
15 CAPSULE, EXTENDED RELEASE ORAL DAILY
Qty: 30 CAPSULE | Refills: 0 | Status: SHIPPED | OUTPATIENT
Start: 2024-12-09

## 2025-01-31 DIAGNOSIS — F90.2 ATTENTION DEFICIT HYPERACTIVITY DISORDER (ADHD), COMBINED TYPE: ICD-10-CM

## 2025-01-31 RX ORDER — DEXTROAMPHETAMINE SACCHARATE, AMPHETAMINE ASPARTATE MONOHYDRATE, DEXTROAMPHETAMINE SULFATE AND AMPHETAMINE SULFATE 3.75; 3.75; 3.75; 3.75 MG/1; MG/1; MG/1; MG/1
15 CAPSULE, EXTENDED RELEASE ORAL DAILY
Qty: 30 CAPSULE | Refills: 0 | Status: SHIPPED | OUTPATIENT
Start: 2025-01-31

## 2025-02-03 ENCOUNTER — APPOINTMENT (OUTPATIENT)
Age: 50
End: 2025-02-03

## 2025-02-03 DIAGNOSIS — F90.2 ATTENTION DEFICIT HYPERACTIVITY DISORDER (ADHD), COMBINED TYPE: ICD-10-CM

## 2025-02-03 DIAGNOSIS — F51.01 PRIMARY INSOMNIA: Primary | ICD-10-CM

## 2025-02-03 PROCEDURE — 99214 OFFICE O/P EST MOD 30 MIN: CPT | Performed by: PSYCHIATRY & NEUROLOGY

## 2025-02-03 RX ORDER — DEXTROAMPHETAMINE SACCHARATE, AMPHETAMINE ASPARTATE MONOHYDRATE, DEXTROAMPHETAMINE SULFATE AND AMPHETAMINE SULFATE 3.75; 3.75; 3.75; 3.75 MG/1; MG/1; MG/1; MG/1
15 CAPSULE, EXTENDED RELEASE ORAL DAILY
Qty: 30 CAPSULE | Refills: 0 | Status: SHIPPED | OUTPATIENT
Start: 2025-02-03

## 2025-03-10 DIAGNOSIS — F90.2 ATTENTION DEFICIT HYPERACTIVITY DISORDER (ADHD), COMBINED TYPE: ICD-10-CM

## 2025-03-10 RX ORDER — DEXTROAMPHETAMINE SACCHARATE, AMPHETAMINE ASPARTATE MONOHYDRATE, DEXTROAMPHETAMINE SULFATE AND AMPHETAMINE SULFATE 3.75; 3.75; 3.75; 3.75 MG/1; MG/1; MG/1; MG/1
15 CAPSULE, EXTENDED RELEASE ORAL DAILY
Qty: 30 CAPSULE | Refills: 0 | Status: SHIPPED | OUTPATIENT
Start: 2025-03-10 | End: 2025-03-18 | Stop reason: SDUPTHER

## 2025-03-18 ENCOUNTER — BEHAVIORAL HEALTH (OUTPATIENT)
Age: 50
End: 2025-03-18

## 2025-03-18 DIAGNOSIS — F90.2 ATTENTION DEFICIT HYPERACTIVITY DISORDER (ADHD), COMBINED TYPE: ICD-10-CM

## 2025-03-18 DIAGNOSIS — F51.01 PRIMARY INSOMNIA: Primary | ICD-10-CM

## 2025-03-18 PROCEDURE — 99214 OFFICE O/P EST MOD 30 MIN: CPT | Performed by: PSYCHIATRY & NEUROLOGY

## 2025-03-18 RX ORDER — DEXTROAMPHETAMINE SACCHARATE, AMPHETAMINE ASPARTATE MONOHYDRATE, DEXTROAMPHETAMINE SULFATE AND AMPHETAMINE SULFATE 3.75; 3.75; 3.75; 3.75 MG/1; MG/1; MG/1; MG/1
15 CAPSULE, EXTENDED RELEASE ORAL DAILY
Qty: 30 CAPSULE | Refills: 0 | Status: SHIPPED | OUTPATIENT
Start: 2025-03-18

## 2025-04-22 DIAGNOSIS — F90.2 ATTENTION DEFICIT HYPERACTIVITY DISORDER (ADHD), COMBINED TYPE: ICD-10-CM

## 2025-04-22 RX ORDER — DEXTROAMPHETAMINE SACCHARATE, AMPHETAMINE ASPARTATE MONOHYDRATE, DEXTROAMPHETAMINE SULFATE AND AMPHETAMINE SULFATE 3.75; 3.75; 3.75; 3.75 MG/1; MG/1; MG/1; MG/1
15 CAPSULE, EXTENDED RELEASE ORAL DAILY
Qty: 30 CAPSULE | Refills: 0 | Status: SHIPPED | OUTPATIENT
Start: 2025-04-22

## 2025-04-29 ENCOUNTER — TELEPHONE (OUTPATIENT)
Dept: BEHAVIORAL HEALTH | Age: 50
End: 2025-04-29

## 2025-04-30 ENCOUNTER — APPOINTMENT (OUTPATIENT)
Age: 50
End: 2025-04-30

## 2025-04-30 ENCOUNTER — TELEPHONE (OUTPATIENT)
Age: 50
End: 2025-04-30

## 2025-06-04 ENCOUNTER — TELEPHONE (OUTPATIENT)
Dept: BEHAVIORAL HEALTH | Age: 50
End: 2025-06-04

## 2025-06-04 DIAGNOSIS — F90.2 ATTENTION DEFICIT HYPERACTIVITY DISORDER (ADHD), COMBINED TYPE: ICD-10-CM

## 2025-06-04 RX ORDER — DEXTROAMPHETAMINE SACCHARATE, AMPHETAMINE ASPARTATE MONOHYDRATE, DEXTROAMPHETAMINE SULFATE AND AMPHETAMINE SULFATE 3.75; 3.75; 3.75; 3.75 MG/1; MG/1; MG/1; MG/1
15 CAPSULE, EXTENDED RELEASE ORAL DAILY
Qty: 30 CAPSULE | Refills: 0 | Status: SHIPPED | OUTPATIENT
Start: 2025-06-04

## 2025-06-09 ENCOUNTER — APPOINTMENT (OUTPATIENT)
Age: 50
End: 2025-06-09

## 2025-06-09 DIAGNOSIS — F90.2 ATTENTION DEFICIT HYPERACTIVITY DISORDER (ADHD), COMBINED TYPE: ICD-10-CM

## 2025-06-09 DIAGNOSIS — F33.0 MAJOR DEPRESSIVE DISORDER, RECURRENT EPISODE, MILD (CMD): Primary | ICD-10-CM

## 2025-06-09 DIAGNOSIS — F51.01 PRIMARY INSOMNIA: ICD-10-CM

## 2025-06-09 PROBLEM — E80.4 GILBERT'S SYNDROME: Status: ACTIVE | Noted: 2024-04-18

## 2025-06-09 PROBLEM — E11.9 TYPE 2 DIABETES MELLITUS (CMD): Status: ACTIVE | Noted: 2024-04-01

## 2025-06-09 PROBLEM — J98.4 CALCIFIED GRANULOMA OF LUNG: Status: ACTIVE | Noted: 2024-04-25

## 2025-06-09 PROBLEM — R94.31 ABNORMAL EKG: Status: ACTIVE | Noted: 2024-03-13

## 2025-06-09 PROBLEM — I50.31 ACUTE DIASTOLIC HEART FAILURE  (CMD): Status: ACTIVE | Noted: 2024-04-11

## 2025-06-09 PROBLEM — I25.10 NONOBSTRUCTIVE ATHEROSCLEROSIS OF CORONARY ARTERY: Status: ACTIVE | Noted: 2024-04-25

## 2025-06-09 PROCEDURE — 99214 OFFICE O/P EST MOD 30 MIN: CPT | Performed by: PSYCHIATRY & NEUROLOGY

## 2025-06-09 RX ORDER — DEXTROAMPHETAMINE SACCHARATE, AMPHETAMINE ASPARTATE MONOHYDRATE, DEXTROAMPHETAMINE SULFATE AND AMPHETAMINE SULFATE 3.75; 3.75; 3.75; 3.75 MG/1; MG/1; MG/1; MG/1
15 CAPSULE, EXTENDED RELEASE ORAL DAILY
Qty: 30 CAPSULE | Refills: 0 | Status: SHIPPED | OUTPATIENT
Start: 2025-06-09

## 2025-06-09 RX ORDER — HYDROXYZINE HYDROCHLORIDE 10 MG/1
10-20 TABLET, FILM COATED ORAL NIGHTLY PRN
Qty: 60 TABLET | Refills: 0 | Status: SHIPPED | OUTPATIENT
Start: 2025-06-09

## 2025-07-07 ENCOUNTER — APPOINTMENT (OUTPATIENT)
Age: 50
End: 2025-07-07

## 2025-07-07 DIAGNOSIS — F51.01 PRIMARY INSOMNIA: Primary | ICD-10-CM

## 2025-07-07 DIAGNOSIS — F90.2 ATTENTION DEFICIT HYPERACTIVITY DISORDER (ADHD), COMBINED TYPE: ICD-10-CM

## 2025-07-07 PROCEDURE — 99214 OFFICE O/P EST MOD 30 MIN: CPT | Performed by: PSYCHIATRY & NEUROLOGY

## 2025-07-07 RX ORDER — DEXTROAMPHETAMINE SACCHARATE, AMPHETAMINE ASPARTATE MONOHYDRATE, DEXTROAMPHETAMINE SULFATE AND AMPHETAMINE SULFATE 3.75; 3.75; 3.75; 3.75 MG/1; MG/1; MG/1; MG/1
15 CAPSULE, EXTENDED RELEASE ORAL DAILY
Qty: 30 CAPSULE | Refills: 0 | Status: SHIPPED | OUTPATIENT
Start: 2025-07-07

## 2025-07-07 RX ORDER — HYDROXYZINE HYDROCHLORIDE 10 MG/1
10-20 TABLET, FILM COATED ORAL NIGHTLY PRN
Qty: 60 TABLET | Refills: 0 | Status: SHIPPED | OUTPATIENT
Start: 2025-07-07

## 2025-08-19 DIAGNOSIS — F90.2 ATTENTION DEFICIT HYPERACTIVITY DISORDER (ADHD), COMBINED TYPE: ICD-10-CM

## 2025-08-19 RX ORDER — DEXTROAMPHETAMINE SACCHARATE, AMPHETAMINE ASPARTATE MONOHYDRATE, DEXTROAMPHETAMINE SULFATE AND AMPHETAMINE SULFATE 3.75; 3.75; 3.75; 3.75 MG/1; MG/1; MG/1; MG/1
15 CAPSULE, EXTENDED RELEASE ORAL DAILY
Qty: 30 CAPSULE | Refills: 0 | Status: SHIPPED | OUTPATIENT
Start: 2025-08-19

## 2025-08-28 ENCOUNTER — APPOINTMENT (OUTPATIENT)
Age: 50
End: 2025-08-28

## 2025-08-28 DIAGNOSIS — F51.01 PRIMARY INSOMNIA: Primary | ICD-10-CM

## 2025-08-28 DIAGNOSIS — F90.2 ATTENTION DEFICIT HYPERACTIVITY DISORDER (ADHD), COMBINED TYPE: ICD-10-CM

## 2025-08-28 RX ORDER — DEXTROAMPHETAMINE SACCHARATE, AMPHETAMINE ASPARTATE MONOHYDRATE, DEXTROAMPHETAMINE SULFATE AND AMPHETAMINE SULFATE 3.75; 3.75; 3.75; 3.75 MG/1; MG/1; MG/1; MG/1
15 CAPSULE, EXTENDED RELEASE ORAL DAILY
Qty: 30 CAPSULE | Refills: 0 | Status: SHIPPED | OUTPATIENT
Start: 2025-08-28

## (undated) NOTE — LETTER
Date & Time: 3/26/2018, 3:36 PM  Patient: Nick Fontenot  Attending Provider:    Sincerely,    ZACK Hartman         To Whom It May Concern:    Melissa Licona was seen and treated in our department on 3/26/2018. He can return to work.     If you have

## (undated) NOTE — LETTER
Date & Time: 3/26/2018, 3:36 PM  Patient: April Risk  Attending Provider:    Sincerely,    ZACK Lebron         To Whom It May Concern:    Emma Rodríguez was seen and treated in our department on 3/26/2018. He can return to work.     If you have

## (undated) NOTE — IP AVS SNAPSHOT
[address]  [address] [facility] [phone]                Discharge Summary   5/27/2017    Mr. Farzana Valentine           Admission Information        Provider Department    5/27/2017 Ladonna Bustos DO [facility] Where to Get Your Medications      Please  your prescriptions at the location directed by your doctor or nurse     Bring a paper prescription for each of these medications    - hydrochlorothiazide 12.5 MG Caps  - lisinopril 5 MG Tabs              Pa Neutrophil % Lymphocyte % Monocyte % Eosinophil % Basophil % Prelim Neut Abs Final Neut Abs Lymphocyte Abso Monocyte Absolu Eosinophil Abso Basophil Absolu    (05/29/17)  55 (05/29/17)  34 (05/29/17)  8 (05/29/17)  2 (05/29/17)  1 -- (05/29/17)  3.8 (05/2 and ask to get set up for an insurance coverage that is in-network with [facility]. [employer]     Sign up for [employer], your secure online medical record.   [employer] will allow you to access patient instructions from your recent visit,  view weakness, numbness           Water Pills     hydrochlorothiazide 12.5 MG Oral Cap       Use: Treat high blood pressure, swelling in legs, too much fluid    Most common side effects: Dizziness, loss of potassium (increased urination is expected)   What to r

## (undated) NOTE — LETTER
[facility] [place]  [address] [address]  Dept: [phone]  Dept Fax: [phone]  Loc: [phone]      March 19, 2018    Patient: Brittnee Taylor   Date of Visit: 3/19/2018       To Whom It May Concern:    Mallorie Parra

## (undated) NOTE — ED AVS SNAPSHOT
Mr. Farzana Valentine   MRN: [id number]    Department:  [facility] Emergency Department   Date of Visit:  3/17/2020           Disclosure     Insurance plans vary and the physician(s) referred by the ER may not be covered by your plan.  Please conta CARE PHYSICIAN AT ONCE OR RETURN IMMEDIATELY TO THE EMERGENCY DEPARTMENT. If you have been prescribed any medication(s), please fill your prescription right away and begin taking the medication(s) as directed.   If you believe that any of the medications

## (undated) NOTE — LETTER
[facility] [place] [address]  [address] [address]  Dept: [phone]  Dept Fax: [phone]  Loc: [phone]      March 19, 2018    Patient: Virgia Closs   Date of Visit: 3/19/2018       To Whom It May Concern:    Krystle Paris